# Patient Record
Sex: MALE | Race: WHITE | HISPANIC OR LATINO | Employment: OTHER | URBAN - METROPOLITAN AREA
[De-identification: names, ages, dates, MRNs, and addresses within clinical notes are randomized per-mention and may not be internally consistent; named-entity substitution may affect disease eponyms.]

---

## 2023-09-23 ENCOUNTER — HOSPITAL ENCOUNTER (INPATIENT)
Facility: CLINIC | Age: 58
LOS: 2 days | Discharge: HOME | DRG: 313 | End: 2023-09-25
Attending: EMERGENCY MEDICINE | Admitting: INTERNAL MEDICINE
Payer: COMMERCIAL

## 2023-09-23 ENCOUNTER — TRANSFERRED RECORDS (OUTPATIENT)
Dept: HEALTH INFORMATION MANAGEMENT | Facility: CLINIC | Age: 58
End: 2023-09-23

## 2023-09-23 ENCOUNTER — APPOINTMENT (OUTPATIENT)
Dept: RADIOLOGY | Facility: CLINIC | Age: 58
DRG: 313 | End: 2023-09-23
Attending: EMERGENCY MEDICINE
Payer: COMMERCIAL

## 2023-09-23 DIAGNOSIS — I10 BENIGN ESSENTIAL HYPERTENSION: Primary | ICD-10-CM

## 2023-09-23 DIAGNOSIS — R07.9 CHEST PAIN, UNSPECIFIED TYPE: ICD-10-CM

## 2023-09-23 DIAGNOSIS — R94.31 ACUTE ELECTROCARDIOGRAM CHANGES: ICD-10-CM

## 2023-09-23 LAB
ALBUMIN SERPL BCG-MCNC: 4.1 G/DL (ref 3.5–5.2)
ALP SERPL-CCNC: 89 U/L (ref 40–129)
ALT SERPL W P-5'-P-CCNC: 28 U/L (ref 0–70)
ANION GAP SERPL CALCULATED.3IONS-SCNC: 11 MMOL/L (ref 7–15)
AST SERPL W P-5'-P-CCNC: 24 U/L (ref 0–45)
ATRIAL RATE - MUSE: 76 BPM
BASOPHILS # BLD AUTO: 0.1 10E3/UL (ref 0–0.2)
BASOPHILS NFR BLD AUTO: 1 %
BILIRUB SERPL-MCNC: 0.4 MG/DL
BUN SERPL-MCNC: 11 MG/DL (ref 6–20)
CALCIUM SERPL-MCNC: 8.7 MG/DL (ref 8.6–10)
CHLORIDE SERPL-SCNC: 103 MMOL/L (ref 98–107)
CK SERPL-CCNC: 93 U/L (ref 39–308)
CREAT SERPL-MCNC: 0.87 MG/DL (ref 0.67–1.17)
DEPRECATED HCO3 PLAS-SCNC: 25 MMOL/L (ref 22–29)
DIASTOLIC BLOOD PRESSURE - MUSE: NORMAL MMHG
EGFRCR SERPLBLD CKD-EPI 2021: >90 ML/MIN/1.73M2
EOSINOPHIL # BLD AUTO: 0.2 10E3/UL (ref 0–0.7)
EOSINOPHIL NFR BLD AUTO: 3 %
ERYTHROCYTE [DISTWIDTH] IN BLOOD BY AUTOMATED COUNT: 12.3 % (ref 10–15)
GLUCOSE SERPL-MCNC: 94 MG/DL (ref 70–99)
HCT VFR BLD AUTO: 46.3 % (ref 40–53)
HGB BLD-MCNC: 16 G/DL (ref 13.3–17.7)
IMM GRANULOCYTES # BLD: 0 10E3/UL
IMM GRANULOCYTES NFR BLD: 0 %
INTERPRETATION ECG - MUSE: NORMAL
LYMPHOCYTES # BLD AUTO: 2.3 10E3/UL (ref 0.8–5.3)
LYMPHOCYTES NFR BLD AUTO: 38 %
MCH RBC QN AUTO: 30.2 PG (ref 26.5–33)
MCHC RBC AUTO-ENTMCNC: 34.6 G/DL (ref 31.5–36.5)
MCV RBC AUTO: 87 FL (ref 78–100)
MONOCYTES # BLD AUTO: 0.5 10E3/UL (ref 0–1.3)
MONOCYTES NFR BLD AUTO: 9 %
NEUTROPHILS # BLD AUTO: 3 10E3/UL (ref 1.6–8.3)
NEUTROPHILS NFR BLD AUTO: 49 %
NRBC # BLD AUTO: 0 10E3/UL
NRBC BLD AUTO-RTO: 0 /100
P AXIS - MUSE: 49 DEGREES
PLATELET # BLD AUTO: 223 10E3/UL (ref 150–450)
POTASSIUM SERPL-SCNC: 3.8 MMOL/L (ref 3.4–5.3)
PR INTERVAL - MUSE: 140 MS
PROT SERPL-MCNC: 7.5 G/DL (ref 6.4–8.3)
QRS DURATION - MUSE: 92 MS
QT - MUSE: 382 MS
QTC - MUSE: 429 MS
R AXIS - MUSE: 6 DEGREES
RBC # BLD AUTO: 5.3 10E6/UL (ref 4.4–5.9)
SODIUM SERPL-SCNC: 139 MMOL/L (ref 136–145)
SYSTOLIC BLOOD PRESSURE - MUSE: NORMAL MMHG
T AXIS - MUSE: 26 DEGREES
TROPONIN T SERPL HS-MCNC: 16 NG/L
VENTRICULAR RATE- MUSE: 76 BPM
WBC # BLD AUTO: 6.1 10E3/UL (ref 4–11)

## 2023-09-23 PROCEDURE — 36415 COLL VENOUS BLD VENIPUNCTURE: CPT | Performed by: STUDENT IN AN ORGANIZED HEALTH CARE EDUCATION/TRAINING PROGRAM

## 2023-09-23 PROCEDURE — 96365 THER/PROPH/DIAG IV INF INIT: CPT

## 2023-09-23 PROCEDURE — 250N000011 HC RX IP 250 OP 636: Mod: JZ | Performed by: EMERGENCY MEDICINE

## 2023-09-23 PROCEDURE — 93005 ELECTROCARDIOGRAM TRACING: CPT | Performed by: EMERGENCY MEDICINE

## 2023-09-23 PROCEDURE — 83735 ASSAY OF MAGNESIUM: CPT | Performed by: INTERNAL MEDICINE

## 2023-09-23 PROCEDURE — 200N000001 HC R&B ICU

## 2023-09-23 PROCEDURE — 85025 COMPLETE CBC W/AUTO DIFF WBC: CPT | Performed by: STUDENT IN AN ORGANIZED HEALTH CARE EDUCATION/TRAINING PROGRAM

## 2023-09-23 PROCEDURE — 96376 TX/PRO/DX INJ SAME DRUG ADON: CPT

## 2023-09-23 PROCEDURE — 99285 EMERGENCY DEPT VISIT HI MDM: CPT | Mod: 25

## 2023-09-23 PROCEDURE — 250N000013 HC RX MED GY IP 250 OP 250 PS 637: Performed by: EMERGENCY MEDICINE

## 2023-09-23 PROCEDURE — 80053 COMPREHEN METABOLIC PANEL: CPT | Performed by: STUDENT IN AN ORGANIZED HEALTH CARE EDUCATION/TRAINING PROGRAM

## 2023-09-23 PROCEDURE — 82550 ASSAY OF CK (CPK): CPT | Performed by: EMERGENCY MEDICINE

## 2023-09-23 PROCEDURE — 84484 ASSAY OF TROPONIN QUANT: CPT | Performed by: STUDENT IN AN ORGANIZED HEALTH CARE EDUCATION/TRAINING PROGRAM

## 2023-09-23 PROCEDURE — 71045 X-RAY EXAM CHEST 1 VIEW: CPT

## 2023-09-23 PROCEDURE — 99223 1ST HOSP IP/OBS HIGH 75: CPT | Performed by: INTERNAL MEDICINE

## 2023-09-23 RX ORDER — ASPIRIN 81 MG/1
162 TABLET, CHEWABLE ORAL ONCE
Status: COMPLETED | OUTPATIENT
Start: 2023-09-23 | End: 2023-09-23

## 2023-09-23 RX ORDER — MORPHINE SULFATE 2 MG/ML
2 INJECTION, SOLUTION INTRAMUSCULAR; INTRAVENOUS EVERY 4 HOURS PRN
Status: DISCONTINUED | OUTPATIENT
Start: 2023-09-23 | End: 2023-09-25

## 2023-09-23 RX ORDER — HYDROCODONE BITARTRATE AND ACETAMINOPHEN 5; 325 MG/1; MG/1
1 TABLET ORAL EVERY 4 HOURS PRN
Status: DISCONTINUED | OUTPATIENT
Start: 2023-09-23 | End: 2023-09-25

## 2023-09-23 RX ORDER — ONDANSETRON 2 MG/ML
4 INJECTION INTRAMUSCULAR; INTRAVENOUS EVERY 6 HOURS PRN
Status: DISCONTINUED | OUTPATIENT
Start: 2023-09-23 | End: 2023-09-25 | Stop reason: HOSPADM

## 2023-09-23 RX ORDER — NITROGLYCERIN 0.4 MG/1
0.4 TABLET SUBLINGUAL EVERY 5 MIN PRN
Status: DISCONTINUED | OUTPATIENT
Start: 2023-09-23 | End: 2023-09-25 | Stop reason: HOSPADM

## 2023-09-23 RX ORDER — ASPIRIN 81 MG/1
162 TABLET, CHEWABLE ORAL DAILY
Status: DISCONTINUED | OUTPATIENT
Start: 2023-09-24 | End: 2023-09-24

## 2023-09-23 RX ORDER — ACETAMINOPHEN 325 MG/1
650 TABLET ORAL EVERY 4 HOURS PRN
Status: DISCONTINUED | OUTPATIENT
Start: 2023-09-23 | End: 2023-09-25

## 2023-09-23 RX ORDER — HEPARIN SODIUM 10000 [USP'U]/100ML
0-5000 INJECTION, SOLUTION INTRAVENOUS CONTINUOUS
Status: DISCONTINUED | OUTPATIENT
Start: 2023-09-23 | End: 2023-09-24

## 2023-09-23 RX ORDER — SODIUM CHLORIDE 9 MG/ML
INJECTION, SOLUTION INTRAVENOUS CONTINUOUS
Status: DISCONTINUED | OUTPATIENT
Start: 2023-09-24 | End: 2023-09-24

## 2023-09-23 RX ORDER — ACETAMINOPHEN 650 MG/1
650 SUPPOSITORY RECTAL EVERY 6 HOURS PRN
Status: DISCONTINUED | OUTPATIENT
Start: 2023-09-23 | End: 2023-09-25

## 2023-09-23 RX ORDER — LORATADINE 10 MG/1
10 TABLET ORAL DAILY PRN
COMMUNITY

## 2023-09-23 RX ORDER — ONDANSETRON 4 MG/1
4 TABLET, ORALLY DISINTEGRATING ORAL EVERY 6 HOURS PRN
Status: DISCONTINUED | OUTPATIENT
Start: 2023-09-23 | End: 2023-09-25 | Stop reason: HOSPADM

## 2023-09-23 RX ORDER — IBUPROFEN 200 MG
400 TABLET ORAL EVERY 4 HOURS PRN
COMMUNITY

## 2023-09-23 RX ADMIN — ASPIRIN 81 MG CHEWABLE TABLET 162 MG: 81 TABLET CHEWABLE at 21:39

## 2023-09-23 RX ADMIN — HEPARIN SODIUM 1200 UNITS/HR: 10000 INJECTION, SOLUTION INTRAVENOUS at 21:55

## 2023-09-23 ASSESSMENT — ENCOUNTER SYMPTOMS: PALPITATIONS: 1

## 2023-09-23 ASSESSMENT — ACTIVITIES OF DAILY LIVING (ADL): ADLS_ACUITY_SCORE: 35

## 2023-09-24 ENCOUNTER — APPOINTMENT (OUTPATIENT)
Dept: CARDIOLOGY | Facility: CLINIC | Age: 58
DRG: 313 | End: 2023-09-24
Attending: STUDENT IN AN ORGANIZED HEALTH CARE EDUCATION/TRAINING PROGRAM
Payer: COMMERCIAL

## 2023-09-24 LAB
ALBUMIN SERPL BCG-MCNC: 4 G/DL (ref 3.5–5.2)
ALP SERPL-CCNC: 81 U/L (ref 40–129)
ALT SERPL W P-5'-P-CCNC: 28 U/L (ref 0–70)
ANION GAP SERPL CALCULATED.3IONS-SCNC: 10 MMOL/L (ref 7–15)
AST SERPL W P-5'-P-CCNC: 23 U/L (ref 0–45)
BASOPHILS # BLD AUTO: 0.1 10E3/UL (ref 0–0.2)
BASOPHILS NFR BLD AUTO: 1 %
BILIRUB SERPL-MCNC: 0.5 MG/DL
BUN SERPL-MCNC: 9.9 MG/DL (ref 6–20)
CALCIUM SERPL-MCNC: 8.7 MG/DL (ref 8.6–10)
CHLORIDE SERPL-SCNC: 103 MMOL/L (ref 98–107)
CHOLEST SERPL-MCNC: 163 MG/DL
CHOLEST SERPL-MCNC: 171 MG/DL
CREAT SERPL-MCNC: 0.86 MG/DL (ref 0.67–1.17)
D DIMER PPP FEU-MCNC: <=0.27 UG/ML FEU (ref 0–0.5)
DEPRECATED HCO3 PLAS-SCNC: 26 MMOL/L (ref 22–29)
EGFRCR SERPLBLD CKD-EPI 2021: >90 ML/MIN/1.73M2
EOSINOPHIL # BLD AUTO: 0.1 10E3/UL (ref 0–0.7)
EOSINOPHIL NFR BLD AUTO: 1 %
ERYTHROCYTE [DISTWIDTH] IN BLOOD BY AUTOMATED COUNT: 12.3 % (ref 10–15)
GLUCOSE SERPL-MCNC: 99 MG/DL (ref 70–99)
HBA1C MFR BLD: 5.6 %
HCT VFR BLD AUTO: 45.8 % (ref 40–53)
HDLC SERPL-MCNC: 36 MG/DL
HDLC SERPL-MCNC: 37 MG/DL
HGB BLD-MCNC: 15.9 G/DL (ref 13.3–17.7)
IMM GRANULOCYTES # BLD: 0 10E3/UL
IMM GRANULOCYTES NFR BLD: 0 %
LDLC SERPL CALC-MCNC: 104 MG/DL
LDLC SERPL CALC-MCNC: 109 MG/DL
LVEF ECHO: NORMAL
LYMPHOCYTES # BLD AUTO: 2.6 10E3/UL (ref 0.8–5.3)
LYMPHOCYTES NFR BLD AUTO: 30 %
MAGNESIUM SERPL-MCNC: 2.1 MG/DL (ref 1.7–2.3)
MCH RBC QN AUTO: 30.3 PG (ref 26.5–33)
MCHC RBC AUTO-ENTMCNC: 34.7 G/DL (ref 31.5–36.5)
MCV RBC AUTO: 87 FL (ref 78–100)
MONOCYTES # BLD AUTO: 0.6 10E3/UL (ref 0–1.3)
MONOCYTES NFR BLD AUTO: 7 %
NEUTROPHILS # BLD AUTO: 5.4 10E3/UL (ref 1.6–8.3)
NEUTROPHILS NFR BLD AUTO: 61 %
NONHDLC SERPL-MCNC: 127 MG/DL
NONHDLC SERPL-MCNC: 134 MG/DL
NRBC # BLD AUTO: 0 10E3/UL
NRBC BLD AUTO-RTO: 0 /100
PLATELET # BLD AUTO: 212 10E3/UL (ref 150–450)
POTASSIUM SERPL-SCNC: 4.4 MMOL/L (ref 3.4–5.3)
PROT SERPL-MCNC: 7.1 G/DL (ref 6.4–8.3)
RBC # BLD AUTO: 5.25 10E6/UL (ref 4.4–5.9)
SODIUM SERPL-SCNC: 139 MMOL/L (ref 136–145)
TRIGL SERPL-MCNC: 113 MG/DL
TRIGL SERPL-MCNC: 123 MG/DL
TROPONIN T SERPL HS-MCNC: 17 NG/L
TROPONIN T SERPL HS-MCNC: 18 NG/L
UFH PPP CHRO-ACNC: 0.24 IU/ML
UFH PPP CHRO-ACNC: 0.37 IU/ML
WBC # BLD AUTO: 8.8 10E3/UL (ref 4–11)

## 2023-09-24 PROCEDURE — 85025 COMPLETE CBC W/AUTO DIFF WBC: CPT | Performed by: INTERNAL MEDICINE

## 2023-09-24 PROCEDURE — 85520 HEPARIN ASSAY: CPT | Performed by: INTERNAL MEDICINE

## 2023-09-24 PROCEDURE — 85379 FIBRIN DEGRADATION QUANT: CPT | Performed by: STUDENT IN AN ORGANIZED HEALTH CARE EDUCATION/TRAINING PROGRAM

## 2023-09-24 PROCEDURE — 250N000013 HC RX MED GY IP 250 OP 250 PS 637: Performed by: INTERNAL MEDICINE

## 2023-09-24 PROCEDURE — 99222 1ST HOSP IP/OBS MODERATE 55: CPT | Mod: 25 | Performed by: INTERNAL MEDICINE

## 2023-09-24 PROCEDURE — 84484 ASSAY OF TROPONIN QUANT: CPT | Performed by: INTERNAL MEDICINE

## 2023-09-24 PROCEDURE — 250N000013 HC RX MED GY IP 250 OP 250 PS 637: Performed by: STUDENT IN AN ORGANIZED HEALTH CARE EDUCATION/TRAINING PROGRAM

## 2023-09-24 PROCEDURE — 99232 SBSQ HOSP IP/OBS MODERATE 35: CPT | Performed by: STUDENT IN AN ORGANIZED HEALTH CARE EDUCATION/TRAINING PROGRAM

## 2023-09-24 PROCEDURE — 258N000003 HC RX IP 258 OP 636: Performed by: INTERNAL MEDICINE

## 2023-09-24 PROCEDURE — 36415 COLL VENOUS BLD VENIPUNCTURE: CPT | Performed by: INTERNAL MEDICINE

## 2023-09-24 PROCEDURE — 84484 ASSAY OF TROPONIN QUANT: CPT | Performed by: STUDENT IN AN ORGANIZED HEALTH CARE EDUCATION/TRAINING PROGRAM

## 2023-09-24 PROCEDURE — 83036 HEMOGLOBIN GLYCOSYLATED A1C: CPT | Performed by: STUDENT IN AN ORGANIZED HEALTH CARE EDUCATION/TRAINING PROGRAM

## 2023-09-24 PROCEDURE — 120N000013 HC R&B IMCU

## 2023-09-24 PROCEDURE — 80053 COMPREHEN METABOLIC PANEL: CPT | Performed by: INTERNAL MEDICINE

## 2023-09-24 PROCEDURE — 93306 TTE W/DOPPLER COMPLETE: CPT

## 2023-09-24 PROCEDURE — 85520 HEPARIN ASSAY: CPT | Performed by: EMERGENCY MEDICINE

## 2023-09-24 PROCEDURE — 80061 LIPID PANEL: CPT | Performed by: INTERNAL MEDICINE

## 2023-09-24 PROCEDURE — 36415 COLL VENOUS BLD VENIPUNCTURE: CPT | Performed by: STUDENT IN AN ORGANIZED HEALTH CARE EDUCATION/TRAINING PROGRAM

## 2023-09-24 PROCEDURE — 93306 TTE W/DOPPLER COMPLETE: CPT | Mod: 26 | Performed by: INTERNAL MEDICINE

## 2023-09-24 RX ORDER — ENOXAPARIN SODIUM 100 MG/ML
40 INJECTION SUBCUTANEOUS EVERY 24 HOURS
Status: DISCONTINUED | OUTPATIENT
Start: 2023-09-24 | End: 2023-09-25 | Stop reason: HOSPADM

## 2023-09-24 RX ORDER — LANOLIN ALCOHOL/MO/W.PET/CERES
3 CREAM (GRAM) TOPICAL
Status: DISCONTINUED | OUTPATIENT
Start: 2023-09-24 | End: 2023-09-25 | Stop reason: HOSPADM

## 2023-09-24 RX ORDER — LOSARTAN POTASSIUM 50 MG/1
50 TABLET ORAL DAILY
Status: DISCONTINUED | OUTPATIENT
Start: 2023-09-24 | End: 2023-09-25 | Stop reason: HOSPADM

## 2023-09-24 RX ORDER — ASPIRIN 81 MG/1
81 TABLET ORAL DAILY
Status: DISCONTINUED | OUTPATIENT
Start: 2023-09-25 | End: 2023-09-25

## 2023-09-24 RX ADMIN — LOSARTAN POTASSIUM 50 MG: 50 TABLET, FILM COATED ORAL at 11:54

## 2023-09-24 RX ADMIN — SODIUM CHLORIDE: 9 INJECTION, SOLUTION INTRAVENOUS at 00:57

## 2023-09-24 RX ADMIN — ACETAMINOPHEN 650 MG: 325 TABLET ORAL at 20:26

## 2023-09-24 RX ADMIN — ACETAMINOPHEN 650 MG: 325 TABLET ORAL at 11:54

## 2023-09-24 RX ADMIN — METOPROLOL TARTRATE 12.5 MG: 25 TABLET, FILM COATED ORAL at 08:09

## 2023-09-24 RX ADMIN — ASPIRIN 81 MG CHEWABLE TABLET 162 MG: 81 TABLET CHEWABLE at 08:09

## 2023-09-24 ASSESSMENT — ACTIVITIES OF DAILY LIVING (ADL)
ADLS_ACUITY_SCORE: 20
WEAR_GLASSES_OR_BLIND: YES
DRESSING/BATHING_DIFFICULTY: NO
ADLS_ACUITY_SCORE: 20
CHANGE_IN_FUNCTIONAL_STATUS_SINCE_ONSET_OF_CURRENT_ILLNESS/INJURY: NO
DOING_ERRANDS_INDEPENDENTLY_DIFFICULTY: NO
VISION_MANAGEMENT: GLASSES
FALL_HISTORY_WITHIN_LAST_SIX_MONTHS: NO
ADLS_ACUITY_SCORE: 20
DIFFICULTY_EATING/SWALLOWING: NO
ADLS_ACUITY_SCORE: 20
ADLS_ACUITY_SCORE: 20
WALKING_OR_CLIMBING_STAIRS_DIFFICULTY: NO
ADLS_ACUITY_SCORE: 20
ADLS_ACUITY_SCORE: 35
ADLS_ACUITY_SCORE: 20
ADLS_ACUITY_SCORE: 20
TOILETING_ISSUES: NO
CONCENTRATING,_REMEMBERING_OR_MAKING_DECISIONS_DIFFICULTY: NO
ADLS_ACUITY_SCORE: 20

## 2023-09-24 NOTE — PROGRESS NOTES
Patient is alert and oriented. Pt endorses discomfort in his left chest area. Relieved with reposition and tylenol. Echo done at bedside. Heparin and IV fluids stopped. Pt is NSR on tele with PACs. NPO at midnight for stress test in the AM.     Rachel Frost RN

## 2023-09-24 NOTE — ED NOTES
Expected Patient Referral to ED  9:00 PM    Referring Clinic/Provider:  Dr [facility]    Reason for referral/Clinical facts:  58M,   Left sided chest pain intermittently  EKG looked good  Trop 0.059 (0.034 is upper limit of normal)  VSS    Recommendations provided:  Send to ED for further evaluation    Caller was informed that this institution does possess the capabilities and/or resources to provide for patient and should be transferred to our facility.    Discussed that if direct admit is sought and any hurdles are encountered, this ED would be happy to see the patient and evaluate.    Informed caller that recommendations provided are recommendations based only on the facts provided and that they responsible to accept or reject the advice, or to seek a formal in person consultation as needed and that this ED will see/treat patient should they arrive.      Garland Gonzalez MD  [facility] EMERGENCY ROOM  [address] [address]  [phone]     Garland Gonzalez MD  09/23/23 3749

## 2023-09-24 NOTE — ED TRIAGE NOTES
Patient arrives to the ER after being evaluated at the [facility] for chest discomfort that started a couple days ago. Patient states that he does have some mild shortness of breath, Hypertensive in triage. Trop at UR was 0.059     Triage Assessment       Row Name 09/23/23 2129       Triage Assessment (Adult)    Airway WDL WDL       Respiratory WDL    Respiratory WDL X;rhythm/pattern    Rhythm/Pattern, Respiratory dyspnea on exertion       Skin Circulation/Temperature WDL    Skin Circulation/Temperature WDL WDL       Cardiac WDL    Cardiac WDL X;chest pain       Chest Pain Assessment    Chest Pain Location anterior chest, left    Chest Pain Intervention 12-lead ECG obtained       Peripheral/Neurovascular WDL    Peripheral Neurovascular WDL WDL       Cognitive/Neuro/Behavioral WDL    Cognitive/Neuro/Behavioral WDL WDL

## 2023-09-24 NOTE — PROGRESS NOTES
"[facility]    Medicine Progress Note - Hospitalist Service    Date of Admission:  9/23/2023    Assessment & Plan   Mr. Hooper is a 58 years old man with no known significant past medical history who presented to the hospital with 2 to 3 weeks of intermittent chest pain most likely musculoskeletal in nature.      Chest pain  -2 to 3 weeks of chest pain on the left side.  -Tenderness to palpation  -Increase activity over the last few weeks including playing tennis and weightlifting  -Pain is most consistent with musculoskeletal etiology  -Per documentation, patient had an elevated troponin at the urgent care clinic.  Nevertheless, troponins x3 negative.   -Per documentation, EKG in the ED showed ST segment depression in inferior lateral lead.  I was unable to see this EKG.  EKG in the system without any clear ST segment changes.  -Transthoracic echocardiogram without major abnormalities.  -Patient was started on heparin drip on presentation.    Plan  -Discontinue heparin drip for now.  -N.p.o. after midnight for stress test on 9/25  -Started on metoprolol tartrate this morning, will hold for stress test on 9/25  -Check hemoglobin A1c  -Check D-dimer  -Plan to discharge on 9/25 if stress test is negative.    Hypertension  -Does not check blood pressure at home  -Blood pressure elevated on presentation    Plan  -Appreciate cardiology recommendation, patient was started on losartan 50 mg daily.  -Continue to monitor blood pressure.       Diet: Regular Diet Adult    DVT Prophylaxis: Enoxaparin (Lovenox) SQ  Wolf Catheter: Not present  Lines: None     Cardiac Monitoring: ACTIVE order. Indication: Chest pain/ ACS rule out (24 hours)  Code Status: Full Code      Clinically Significant Risk Factors Present on Admission                       # Obesity: Estimated body mass index is 36.78 kg/m  as calculated from the following:    Height as of this encounter: 1.727 m (5' 8\").    Weight as of this " encounter: 109.7 kg (241 lb 14.4 oz).              Disposition Plan     Expected Discharge Date: 09/25/2023      Destination: home            JOHN MCCAIN MD  Hospitalist Service  [facility]  Securely message with [employer] (more info)  Text page via "[employer] Paging/Directory   ______________________________________________________________________    Interval History   No significant events.  This morning, patient did not have any significant chest pain or shortness of breath.  He denies any heart palpitation.  He denies any lightheadedness or diaphoresis.      Physical Exam   Vital Signs: Temp: 99.1  F (37.3  C) Temp src: Oral BP: (!) 146/91 Pulse: 72   Resp: 20 SpO2: 95 % O2 Device: None (Room air)    Weight: 241 lbs 14.4 oz    Physical Exam  Constitutional:       General: He is not in acute distress.     Appearance: He is not ill-appearing or toxic-appearing.   Cardiovascular:      Rate and Rhythm: Normal rate.      Heart sounds: No murmur heard.  Pulmonary:      Effort: Pulmonary effort is normal. No respiratory distress.      Breath sounds: No wheezing.   Musculoskeletal:      Comments: Point tenderness to palpation of the pectoralis muscle on the left side.   Skin:     General: Skin is warm and dry.   Neurological:      Mental Status: He is alert.   Psychiatric:         Mood and Affect: Mood normal.         Thought Content: Thought content normal.          Medical Decision Making       45 MINUTES SPENT BY ME on the date of service doing chart review, history, exam, documentation & further activities per the note.      Data     I have personally reviewed the following data over the past 24 hrs:    8.8  \   15.9   / 212     139 103 9.9 /  99   4.4 26 0.86 \     ALT: 28 AST: 23 AP: 81 TBILI: 0.5   ALB: 4.0 TOT PROTEIN: 7.1 LIPASE: N/A     Trop: 17 BNP: N/A       Imaging results reviewed over the past 24 hrs:   Recent Results (from the past 24 hour(s))   XR CHEST 2 VIEWS PA AND LATERAL     Narrative    For Patients: As a result of the  Cures Act, medical imaging exams and procedure reports are released immediately into your electronic medical record. You may view this report before your referring provider. If you have questions, please contact your health care provider.    EXAM: XR CHEST 2 VIEWS PA AND LATERAL  LOCATION: [facility]  DATE: 2023    INDICATION: Chest pain  COMPARISON: 1930    Impression    Heart size is normal. Lungs are clear bilaterally. Mediastinum and visualized bony structures are unremarkable.   XR Chest Port 1 View    Narrative    EXAM: XR CHEST PORT 1 VIEW  LOCATION: [facility]  DATE: 2023    INDICATION: Chest pain  COMPARISON: 2023      Impression    IMPRESSION: Negative chest.   Echocardiogram Complete   Result Value    LVEF  60-65%    Narrative    [id number]  [id number]  [id number]  [id number]^KALYN^[place] [address]     Name: EMILY FELTON  MRN: [id number]  : 1965  Study Date: 2023 10:04 AM  Age: 58 yrs  Gender: Male  Patient Location: [facility]  Reason For Study: Chest Pain  Ordering Physician: JOHN MCCAIN  Referring Physician: JOHN MCCAIN  Performed By: MAYELIN     BSA: 2.2 m2  Height: 68 in  Weight: 241 lb  HR: 67  ______________________________________________________________________________  Procedure  Complete Echo Adult. No hemodynamically significant valvular abnormalities on  2D or color flow imaging.  ______________________________________________________________________________  Interpretation Summary     The left ventricle is normal in size.  Left ventricular function is normal.The ejection fraction is 60-65%.  Normal right ventricle size and systolic function.  The left atrium is mildly dilated.  No hemodynamically significant valvular abnormalities on 2D or color  flow  imaging.  ______________________________________________________________________________  Left Ventricle  The left ventricle is normal in size. Left ventricular function is normal.The  ejection fraction is 60-65%. There is normal left ventricular wall thickness.  Left ventricular diastolic function is normal. No regional wall motion  abnormalities noted.     Right Ventricle  Normal right ventricle size and systolic function.     Atria  The left atrium is mildly dilated. Right atrial size is normal. There is no  color Doppler evidence of an atrial shunt.     Mitral Valve  Mitral valve leaflets appear normal. There is no evidence of mitral stenosis  or clinically significant mitral regurgitation.     Tricuspid Valve  The tricuspid valve is not well visualized, but is grossly normal. This degree  of valvular regurgitation is within normal limits.     Aortic Valve  Aortic valve leaflets appear normal. There is no evidence of aortic stenosis  or clinically significant aortic regurgitation.     Pulmonic Valve  The pulmonic valve is not well visualized.     Vessels  The aorta root is normal. Normal size ascending aorta.     Pericardium  There is no pericardial effusion.     ______________________________________________________________________________  MMode/2D Measurements & Calculations  IVSd: 0.95 cm  LVIDd: 5.5 cm  LVIDs: 3.3 cm  LVPWd: 0.96 cm  FS: 39.4 %     LV mass(C)d: 203.1 grams  LV mass(C)dI: 91.8 grams/m2  Ao root diam: 3.1 cm  LA dimension: 4.0 cm  asc Aorta Diam: 3.3 cm  LA/Ao: 1.3  LVOT diam: 2.4 cm  LVOT area: 4.4 cm2  Ao root diam index Ht(cm/m): 1.8  Ao root diam index BSA (cm/m2): 1.4  Asc Ao diam index BSA (cm/m2): 1.5  Asc Ao diam index Ht(cm/m): 1.9  LA Volume (BP): 54.3 ml  LA Volume Indexed (AL/bp): 25.9 ml/m2     RV Base: 3.8 cm  RWT: 0.35  TAPSE: 2.9 cm     Time Measurements  MM HR: 60.0 BPM     Doppler Measurements & Calculations  MV E max vikas: 51.0 cm/sec  MV A max vikas: 66.4 cm/sec  MV  E/A: 0.77  MV dec slope: 206.9 cm/sec2  MV dec time: 0.25 sec  Ao V2 max: 129.0 cm/sec  Ao max P.0 mmHg  Ao V2 mean: 97.7 cm/sec  Ao mean P.1 mmHg  Ao V2 VTI: 27.5 cm  KENDELL(I,D): 2.5 cm2  KENDELL(V,D): 2.5 cm2  LV V1 max P.2 mmHg  LV V1 max: 73.7 cm/sec  LV V1 VTI: 15.8 cm  SV(LVOT): 69.1 ml  SI(LVOT): 31.3 ml/m2  PA acc time: 0.10 sec  AV Finn Ratio (DI): 0.57  KENDELL Index (cm2/m2): 1.1     E/E' av.5  Lateral E/e': 4.6  Medial E/e': 6.4  RV S Finn: 16.6 cm/sec     ______________________________________________________________________________  Report approved by: Chanel Gutiérrez 2023 02:41 PM

## 2023-09-24 NOTE — CONSULTS
"  HEART CARE ENCOUNTER CONSULTATON NOTE      [facility]  [phone]      Assessment/Recommendations   Assessment:  Chest pain: Chest pain is somewhat atypical in nature.  Although initial troponin at outside facility was mildly abnormal 2 troponins here was negative.  Pain does seem like it may be musculoskeletal.  As we are talking he noted another twinge of chest pain more in the mid chest area.  Discussed further evaluation with stress testing either in or outpatient.  With recurrence of pain he would like to stay until tomorrow for stress testing here.  We will obtain an echocardiogram today.  Hypertension: Poorly controlled  Plan:  Echocardiogram pending  May stop heparin gtt   Aspirin and check fasting lipids   NPO tonight for stress testing tomorrow  Start losartan 50 mg a day for better blood pressure control       History of Present Illness/Subjective    This is a 58 year old man who was admitted for chest pain.  He was initially seen at [facility] urgent care and due to a mildly elevated troponin was transferred to [facility] for further management. He just start exercising more playing tennis and lifting weights.  He started noticing discomfort in his axillae.  Discomfort like an ache.  No tenderness or radiation of pain.  Pain worse with certain movements, positions.  Pain was worse with lifting weights.  Pain nonpleuritic.  Troponin times two negative here.         Physical Examination  Review of Systems   Vitals: BP (!) 172/105 (BP Location: Left arm)   Pulse 72   Temp 98.3  F (36.8  C) (Oral)   Resp 18   Ht 1.727 m (5' 8\")   Wt 109.7 kg (241 lb 14.4 oz)   SpO2 97%   BMI 36.78 kg/m    BMI= Body mass index is 36.78 kg/m .  Wt Readings from Last 3 Encounters:   09/24/23 109.7 kg (241 lb 14.4 oz)       General Appearance:   no distress, normal body habitus   ENT/Mouth: membranes moist, no oral lesions or bleeding gums.      EYES:  no scleral icterus, normal conjunctivae    "    Chest/Lungs:   lungs are clear to auscultation   Cardiovascular:   Regular. Normal first and second heart sounds with no murmur no edema bilaterally        Extremities: no cyanosis or clubbing   Skin: no xanthelasma, warm.    Neurologic: normal  bilateral, no tremors     Psychiatric: alert and oriented x3, calm        Please refer above for cardiac ROS details.        Medical History  Surgical History Family History Social History   hypertension History reviewed. No pertinent surgical history.  Father had history of heart disease in his 70s   Social History     Socioeconomic History    Marital status:      Spouse name: Not on file    Number of children: Not on file    Years of education: Not on file    Highest education level: Not on file   Occupational History    Not on file   Tobacco Use    Smoking status: Not on file    Smokeless tobacco: Not on file   Substance and Sexual Activity    Alcohol use: Not on file    Drug use: Not on file    Sexual activity: Not on file   Other Topics Concern    Not on file   Social History Narrative    Not on file     Social Determinants of Health     Financial Resource Strain: Not on file   Food Insecurity: Not on file   Transportation Needs: Not on file   Physical Activity: Not on file   Stress: Not on file   Social Connections: Not on file   Interpersonal Safety: Not on file   Housing Stability: Not on file           Medications  Allergies   No current outpatient medications on file.     No Known Allergies       Lab Results    Chemistry/lipid CBC Cardiac Enzymes/BNP/TSH/INR   Recent Labs   Lab Test 09/24/23  0421   CHOL 171   HDL 37*   *   TRIG 123     Recent Labs   Lab Test 09/24/23  0421   *     Recent Labs   Lab Test 09/24/23  0421      POTASSIUM 4.4   CHLORIDE 103   CO2 26   GLC 99   BUN 9.9   CR 0.86   GFRESTIMATED >90   HANNA 8.7     Recent Labs   Lab Test 09/24/23  0421 09/23/23  2133   CR 0.86 0.87     No results for input(s): A1C in the  last 86930 hours.       Recent Labs   Lab Test 09/24/23  0421   WBC 8.8   HGB 15.9   HCT 45.8   MCV 87        Recent Labs   Lab Test 09/24/23  0421 09/23/23  2133   HGB 15.9 16.0    No results for input(s): TROPONINI in the last 88759 hours.  No results for input(s): BNP, NTBNPI, NTBNP in the last 05477 hours.  No results for input(s): TSH in the last 73906 hours.  No results for input(s): INR in the last 40781 hours.     Piedad Tinoco MD

## 2023-09-24 NOTE — H&P
[facility] MEDICINE ADMISSION HISTORY AND PHYSICAL       Assessment & Plan      1. Left sided chest pain -- Initial trop at  is (+), and repeat here is negative, EKG suspicious for ischemic changes, early rep, SR    R/O coronary vasospasm  Low suspicion for PE or pericarditis or dissection  Possible risk factors - fam hx with father had CABG, DMI of 35, and possible CRIS    - serial trops, nitroglycerin, may continue heparin and ASA  - cardiology eval  - tele and pulse ox  - lipid check     2. CRIS suspect  - advised sleep study as outpatient - agreed          VTE prophylaxis --  on heparin   Diet -- NPO  Code Status -- Full,  Barriers to discharge -- Admitting medical condition/s  Discharge Disposition and goals --  Unable to determine at this point, pending clinical progress and response to treatment. Patient may need transfer to SNF or [facility] if unsafe to go home and needed treatment inappropriate at home setting OR may need home health care evaluation if care can be delivered at home settings. Consider referral to care manager/    PPE - I was wearing PPE when I met the patient including but not limited to - N95 mask, Gloves, and/or Safety glasses.  Admission Status -- Inpatient     Care plan was created based on available information and patient's condition at the time of encounter. This was discussed with the patient and/or family members using layman's terms, including counseling/education and they have agreed to proceed. I recommend to revise care plan and to review history if there is change in condition and/or new clinical information that is not available during my encounter. At the end of night shift, this case will be presented to the [facility] Emergency Department Hospitalist.       75 minutes spent by me on the date of service doing chart review, history, exam, diagnostic test results interpretation, documentation & further activities per the note.      Yunier Brownlee MD, MPH, FACP,  [facility]  Internal Medicine - Hospitalist        Chief Complaint Chest pain       HISTORY     - Patient is in ED - 9. He is here with chest pain from urgent care, had trop elevation. He was sent to us. Chest XR - negative.    - He presented with 1 week history of intermittent left-sided chest pain. Non radiating. No cough or colds  - Recent lifting weights, trying to lose weights.    - ED course - Trop neg. EKG showed non specific STT changes, ?mild ischemia. He was given heparin infusion, given concerns for EKG, and also ASA     - ROS --- No headache. No dizziness. No weakness. No palpitations. No abdominal pain. No nausea or vomiting. No urinary symptoms. No bleeding symptoms. No weight loss. Rest of 12 point ROS was reviewed and negative.       Past Medical History     History reviewed. No pertinent past medical history.      Surgical History     History reviewed. No pertinent surgical history.     Family History      History reviewed. No pertinent family history.      Social History      .  Social History     Socioeconomic History    Marital status:      Spouse name: Not on file    Number of children: Not on file    Years of education: Not on file    Highest education level: Not on file   Occupational History    Not on file   Tobacco Use    Smoking status: Not on file    Smokeless tobacco: Not on file   Substance and Sexual Activity    Alcohol use: Not on file    Drug use: Not on file    Sexual activity: Not on file   Other Topics Concern    Not on file   Social History Narrative    Not on file     Social Determinants of Health     Financial Resource Strain: Not on file   Food Insecurity: Not on file   Transportation Needs: Not on file   Physical Activity: Not on file   Stress: Not on file   Social Connections: Not on file   Interpersonal Safety: Not on file   Housing Stability: Not on file          Allergies      No Known Allergies      Prior to Admission Medications      No current facility-administered  "medications on file prior to encounter.  ibuprofen (ADVIL/MOTRIN) 200 MG tablet, Take 400 mg by mouth every 4 hours as needed for pain  loratadine (CLARITIN) 10 MG tablet, Take 10 mg by mouth daily as needed for allergies            Review of Systems     A 12 point comprehensive review of systems was negative except as noted above in HPI.    PHYSICAL EXAMINATION       Vitals      Vitals: BP (!) 186/102   Pulse 76   Temp 98.2  F (36.8  C) (Oral)   Resp 22   Ht 1.727 m (5' 8\")   Wt 106.6 kg (235 lb)   SpO2 98%   BMI 35.73 kg/m    BMI= Body mass index is 35.73 kg/m .      Examination     General Appearance:  Alert, cooperative, no distress  Head:    Normocephalic, without obvious abnormality, atraumatic  EENT:  PERRL, conjunctiva/corneas clear, EOM's intact.   Neck:   Supple, symmetrical, trachea midline, no adenopathy; no NVE  Back:  Symmetric, no curvature, no CVA tenderness  Chest/Lungs: Clear to auscultation bilaterally, respirations unlabored, No tenderness or deformity. No abdominal breathing or use of accessory muscles.   Heart:    Regular rate and rhythm, S1 and S2 normal, no murmur, rub   or gallop  Abdomen: Soft, non-tender, bowel sounds active all four quadrants, not peritoneal on palpation. Not distended  Extremities:  Extremities normal, atraumatic, no swelling   Skin:  Skin color, texture, turgor normal, no rashes or lesion  Neurologic:  Awake and alert, No lateralizing or localizing signs       Pertinent Lab     Results for orders placed or performed during the hospital encounter of 09/23/23   XR Chest Port 1 View    Impression    IMPRESSION: Negative chest.   Comprehensive metabolic panel   Result Value Ref Range    Sodium 139 136 - 145 mmol/L    Potassium 3.8 3.4 - 5.3 mmol/L    Chloride 103 98 - 107 mmol/L    Carbon Dioxide (CO2) 25 22 - 29 mmol/L    Anion Gap 11 7 - 15 mmol/L    Urea Nitrogen 11.0 6.0 - 20.0 mg/dL    Creatinine 0.87 0.67 - 1.17 mg/dL    Calcium 8.7 8.6 - 10.0 mg/dL    Glucose " 94 70 - 99 mg/dL    Alkaline Phosphatase 89 40 - 129 U/L    AST 24 0 - 45 U/L    ALT 28 0 - 70 U/L    Protein Total 7.5 6.4 - 8.3 g/dL    Albumin 4.1 3.5 - 5.2 g/dL    Bilirubin Total 0.4 <=1.2 mg/dL    GFR Estimate >90 >60 mL/min/1.73m2   Troponin T, High Sensitivity (now)   Result Value Ref Range    Troponin T, High Sensitivity 16 <=22 ng/L   CBC with platelets and differential   Result Value Ref Range    WBC Count 6.1 4.0 - 11.0 10e3/uL    RBC Count 5.30 4.40 - 5.90 10e6/uL    Hemoglobin 16.0 13.3 - 17.7 g/dL    Hematocrit 46.3 40.0 - 53.0 %    MCV 87 78 - 100 fL    MCH 30.2 26.5 - 33.0 pg    MCHC 34.6 31.5 - 36.5 g/dL    RDW 12.3 10.0 - 15.0 %    Platelet Count 223 150 - 450 10e3/uL    % Neutrophils 49 %    % Lymphocytes 38 %    % Monocytes 9 %    % Eosinophils 3 %    % Basophils 1 %    % Immature Granulocytes 0 %    NRBCs per 100 WBC 0 <1 /100    Absolute Neutrophils 3.0 1.6 - 8.3 10e3/uL    Absolute Lymphocytes 2.3 0.8 - 5.3 10e3/uL    Absolute Monocytes 0.5 0.0 - 1.3 10e3/uL    Absolute Eosinophils 0.2 0.0 - 0.7 10e3/uL    Absolute Basophils 0.1 0.0 - 0.2 10e3/uL    Absolute Immature Granulocytes 0.0 <=0.4 10e3/uL    Absolute NRBCs 0.0 10e3/uL           Pertinent Radiology

## 2023-09-24 NOTE — PROGRESS NOTES
Care Management Follow Up    Length of Stay (days): 1    Expected Discharge Date: 09/25/2023     Concerns to be Addressed: discharge planning,  stress testing tomorrow     Patient plan of care discussed at interdisciplinary rounds: Yes    Anticipated Discharge Disposition: Home     Anticipated Discharge Services: None    Anticipated Discharge DME: None    Education Provided on the Discharge Plan: Yes (AVS will be per bedside RN)    Patient/Family in Agreement with the Plan: yes    Referrals Placed by CM/SW:  n/a        Additional Information:  CM reviewed chart. No CM needs identified. Echocardiogram pending. NPO tonight for stress testing tomorrow. Anticipate discharge home with family. Family to provide transportation home at discharge.     Pretty Wright RN

## 2023-09-24 NOTE — ED PROVIDER NOTES
EMERGENCY DEPARTMENT ENCOUNTER      NAME: Mark Hooper  AGE: 58 year old male  YOB: 1965  MRN: [id number]  EVALUATION DATE & TIME: 9/23/2023  9:33 PM    PCP: No Ref-Primary, Physician    ED PROVIDER: Lorena Maier M.D.      Chief Complaint   Patient presents with    Chest Pain         FINAL IMPRESSION:  1. Chest pain, unspecified type    2. Acute electrocardiogram changes        MEDICAL DECISION MAKING:    Pertinent Labs & Imaging studies reviewed. (See chart for details)  ED Course as of 09/23/23 2245   Sat Sep 23, 2023   2147 Afebrile vital signs here with hypertension.  Patient is coming in for evaluation of chest discomfort has been ongoing for the past week and elevated troponin seen from urgent care.  When in urgent care, states that he has been working out more, has been having some discomfort along the left side of his chest for the last few days.  Also was feeling some heart palpitations during that time.  Says he initially thought it was secondary to musculoskeletal issue.  He did have some pain and discomfort this morning he took an ibuprofen and he did have some relief.  Because discomfort has been ongoing he decided to go to the urgency room just to get checked out.  There he was found to be hypertensive with blood pressures at 183/118.  Had initial EKG performed that did not show any signs of ischemia.  Had blood work performed that showed elevated troponin at a value of 0.059 with her cutoff being 0.034 or below.  As such she was sent to the emergency department for further evaluation.  He did not receive aspirin at urgent care.  I did review the note from the [facility] urgency room from/23/23.  Also reviewed EKG from that visit done at 1959 that shows heart rate 71, sinus.  Normal axis.  No ST elevations or depressions.  No ectopy.  Intervals are intact.  QTc was 413, QRS 96, .  EKG done here on arrival at 2129 shows sinus rhythm at a rate of 76.  He now has slight ST  depression seen in his inferior leads II, III, aVF with some T wave inversion seen in lead III which is new from previous EKG at urgency room.  Otherwise she also has very slight depressions noted in extreme lateral leads V5 and V6.  These seem slightly worsened from previous EKG from 1959.   2147 Patient immediately given aspirin here in the emergency department.  We will recheck labs and troponin.  Started on heparin.  Serial EKGs as needed for any development of chest discomfort.   2147 Of note patient states that he does not have any significant medical history, does not make take medications for anything on a daily basis.     Patient's high-sensitivity troponin here without significant elevation.  Certainly does not put him in the range of NSTEMI.  Was already started on heparin.  However he does have EKG changes as noted above.  Given the fact that he did have an elevated troponin seen at outside facility, has EKGs changes evident here on his EKG despite troponin here that does not seem to be alarming we will continue with plan of care with heparin, admission and cardiology evaluation.  Regardless he will need evaluation for blood pressure, remains elevated here with blood pressures in the 180s.  Was 180s at urgent care.  No previous comparisons for blood pressures for patient in the past.  No diagnosed hypertension in the past either.  Patient was seen in the emergency room on 3/14/2018 at that time was noted to be hypertensive with blood pressure reading at 159/91.    Medical Decision Making    History:  Supplemental history from: Documented in chart, if applicable  External Record(s) reviewed: Documented in chart, if applicable. and Outpatient Record: [facility] Room Visit in [place] on 9/23/23     Work Up:  Chart documentation includes differential considered and any EKGs or imaging independently interpreted by provider, where specified.  In additional to work up documented, I considered the following work  up: Documented in chart, if applicable.    External consultation:  Discussion of management with another provider: Documented in chart, if applicable    Complicating factors:  Care impacted by chronic illness: N/A  Care affected by social determinants of health: N/A    Disposition considerations: Admit.      Critical care: 0 minutes excluding separately billable procedures.  Includes bedside management, time reviewing test results, review of records, discussing the case with staff, documenting the medical record and time spent with family members (or surrogate decision makers) discussing specific treatment issues.          ED COURSE:  9:38 PM I met with the patient, obtained history, performed an initial exam, and discussed options and plan for diagnostics and treatment here in the ED.   10:19 PM I rechecked and updated patient on results. Discussed plan for admission.  10:42 PM I discussed case with Dr. Brownlee, hospitalist, who accepts the patient for admission.     The importance of close follow up was discussed. We reviewed warning signs and symptoms, and I instructed Mr. Hooper to return to the emergency department immediately if he develops any new or worsening symptoms. I provided additional verbal discharge instructions. Mr. Hooper expressed understanding and agreement with this plan of care, his questions were answered, and he was discharged in stable condition.     MEDICATIONS GIVEN IN THE EMERGENCY:  Medications   heparin 25,000 units in 0.45% NaCl 250 mL ANTICOAGULANT infusion (1,200 Units/hr Intravenous $New Bag 9/23/23 2155)   aspirin (ASA) chewable tablet 162 mg (162 mg Oral $Given 9/23/23 2139)   heparin ANTICOAGULANT loading dose for  LOW INTENSITY TREATMENT* Give BEFORE starting heparin infusion (6,000 Units Intravenous $Given 9/23/23 2154)       NEW PRESCRIPTIONS STARTED AT TODAY'S ER VISIT:  New Prescriptions    No medications on file           =================================================================    HPI    Patient information was obtained from: Patient    Use of : N/A         Mark Hooper is a 58 year old male with no pertinent history who presents to the ED via walk-in for the evaluation of chest pain.     Patient reports left chest discomfort that started several days ago. He notes associating palpitations. Patient mentions that he has been working out more and initially thought his symptoms were secondary to a musculoskeletal issue. Patient reports he did have some pain this morning and did take an Ibuprofen with some relief. However, since chest discomfort has persisted, he decided to be seen in the Urgency Room. There, he was found to have a blood pressure of 183/118. Blood work showed elevated troponin. Patient was sent to the ED for further evaluation. He did not receive aspirin there. Otherwise, patient denies any significant medical history. He does not take any medications regularly. No other complaints at this time.    Per chart review, patient was seen at the [facility] Room in [place] on 9/23/23 for chest pain. Chest XR done. Results showed heart size is normal. Lungs are clear bilaterally. Mediastinum and visualized bony structures are unremarkable. EKG without any signs of ischemia. Blood work done. Troponin 0.059. Potassium 3.4. MCH 31.5. Otherwise all other values within normal limits. Patient sent to ED for further evaluation.    REVIEW OF SYSTEMS   Review of Systems   Cardiovascular:  Positive for chest pain and palpitations.   All other systems reviewed and are negative.    PAST MEDICAL HISTORY:  History reviewed. No pertinent past medical history.    PAST SURGICAL HISTORY:  History reviewed. No pertinent surgical history.    CURRENT MEDICATIONS:      Current Facility-Administered Medications:     heparin 25,000 units in 0.45% NaCl 250 mL ANTICOAGULANT infusion, 0-5,000 Units/hr, Intravenous, Continuous,  "Lorena Maier MD, Last Rate: 12 mL/hr at 09/23/23 2155, 1,200 Units/hr at 09/23/23 2155    Current Outpatient Medications:     ibuprofen (ADVIL/MOTRIN) 200 MG tablet, Take 400 mg by mouth every 4 hours as needed for pain, Disp: , Rfl:     loratadine (CLARITIN) 10 MG tablet, Take 10 mg by mouth daily as needed for allergies, Disp: , Rfl:     ALLERGIES:  No Known Allergies    FAMILY HISTORY:  History reviewed. No pertinent family history.    SOCIAL HISTORY:   Social History     Socioeconomic History    Marital status:        PHYSICAL EXAM:    Vitals: BP (!) 186/102   Pulse 76   Temp 98.2  F (36.8  C) (Oral)   Resp 22   Ht 1.727 m (5' 8\")   Wt 106.6 kg (235 lb)   SpO2 98%   BMI 35.73 kg/m     General:. Alert and interactive, comfortable appearing.  HENT: Oropharynx without erythema or exudates. MMM.  TMs clear bilaterally.  Eyes: Pupils mid-sized and equally reactive.   Neck: Full AROM.  No midline tenderness to palpation.  Cardiovascular: Regular rate and rhythm. Peripheral pulses 2+ bilaterally.  Chest/Pulmonary: Normal work of breathing. Lung sounds clear and equal throughout, no wheezes or crackles. No chest wall tenderness or deformities.  Abdomen: Soft, nondistended. Nontender without guarding or rebound.  Back/Spine: No CVA or midline tenderness.  Extremities: Normal ROM of all major joints. No lower extremity edema.   Skin: Warm and dry. Normal skin color.   Neuro: Speech clear. CNs grossly intact. Moves all extremities appropriately. Strength and sensation grossly intact to all extremities.   Psych: Normal affect/mood, cooperative, memory appropriate.    LAB:  All pertinent labs reviewed and interpreted.  Labs Ordered and Resulted from Time of ED Arrival to Time of ED Departure   COMPREHENSIVE METABOLIC PANEL - Normal       Result Value    Sodium 139      Potassium 3.8      Chloride 103      Carbon Dioxide (CO2) 25      Anion Gap 11      Urea Nitrogen 11.0      Creatinine 0.87      Calcium 8.7  "     Glucose 94      Alkaline Phosphatase 89      AST 24      ALT 28      Protein Total 7.5      Albumin 4.1      Bilirubin Total 0.4      GFR Estimate >90     TROPONIN T, HIGH SENSITIVITY - Normal    Troponin T, High Sensitivity 16     CBC WITH PLATELETS AND DIFFERENTIAL    WBC Count 6.1      RBC Count 5.30      Hemoglobin 16.0      Hematocrit 46.3      MCV 87      MCH 30.2      MCHC 34.6      RDW 12.3      Platelet Count 223      % Neutrophils 49      % Lymphocytes 38      % Monocytes 9      % Eosinophils 3      % Basophils 1      % Immature Granulocytes 0      NRBCs per 100 WBC 0      Absolute Neutrophils 3.0      Absolute Lymphocytes 2.3      Absolute Monocytes 0.5      Absolute Eosinophils 0.2      Absolute Basophils 0.1      Absolute Immature Granulocytes 0.0      Absolute NRBCs 0.0     HEPARIN UNFRACTIONATED ANTI XA LEVEL   CK TOTAL       RADIOLOGY:  XR Chest Port 1 View   Final Result   IMPRESSION: Negative chest.          EKG:  See MDM  I have independently reviewed and interpreted the EKG(s) documented above.         I, Mavis Gill, am serving as a scribe to document services personally performed by Dr. Lorena Maier  based on my observation and the provider's statements to me. I, Lorena Maier MD attest that Mavis Gill is acting in a scribe capacity, has observed my performance of the services and has documented them in accordance with my direction.      Lorena Maier M.D.  Emergency Medicine  [facility] EMERGENCY ROOM  [address] [facility] [id number] [phone]  Dept: [phone]     Lorena Maier MD  09/23/23 2344

## 2023-09-24 NOTE — PLAN OF CARE
"  Problem: Pain Acute  Goal: Optimal Pain Control and Function  Outcome: Progressing  Intervention: Develop Pain Management Plan  Recent Flowsheet Documentation  Taken 9/24/2023 0039 by Marleny Flores RN  Pain Management Interventions:   emotional support   environmental changes  Intervention: Prevent or Manage Pain  Recent Flowsheet Documentation  Taken 9/24/2023 0402 by Marleny Flores RN  Medication Review/Management:   medications reviewed   high-risk medications identified  Taken 9/24/2023 0039 by Marleny Flores RN  Medication Review/Management:   medications reviewed   high-risk medications identified   Goal Outcome Evaluation:       Denies pain or SOB. States he gets an \"intermittent fluttery feeling in his left chest\". Heparin gtt infusing, next anti Xa pending. Remains in NSR on the monitor. BP elevated 160's/80's.                  "

## 2023-09-24 NOTE — PHARMACY-ADMISSION MEDICATION HISTORY
Pharmacist Admission Medication History    Admission medication history is complete. The information provided in this note is only as accurate as the sources available at the time of the update.    Medication reconciliation/reorder completed by provider prior to medication history? No    Information Source(s): Patient via in-person    Pertinent Information:     Changes made to PTA medication list:  Added: all  Deleted: None  Changed: None    Medication Affordability:       Allergies reviewed with patient and updates made in EHR: yes    Medication History Completed By: Juan Hill RPH 9/23/2023 10:05 PM    Prior to Admission medications    Medication Sig Last Dose Taking? Auth Provider Long Term End Date   ibuprofen (ADVIL/MOTRIN) 200 MG tablet Take 400 mg by mouth every 4 hours as needed for pain 9/23/2023 at 0900 Yes Unknown, Entered By History     loratadine (CLARITIN) 10 MG tablet Take 10 mg by mouth daily as needed for allergies Past Month Yes Unknown, Entered By History

## 2023-09-25 ENCOUNTER — APPOINTMENT (OUTPATIENT)
Dept: CARDIOLOGY | Facility: CLINIC | Age: 58
DRG: 313 | End: 2023-09-25
Attending: INTERNAL MEDICINE
Payer: COMMERCIAL

## 2023-09-25 VITALS
RESPIRATION RATE: 16 BRPM | BODY MASS INDEX: 36.66 KG/M2 | HEIGHT: 68 IN | TEMPERATURE: 97.7 F | SYSTOLIC BLOOD PRESSURE: 169 MMHG | DIASTOLIC BLOOD PRESSURE: 96 MMHG | HEART RATE: 103 BPM | OXYGEN SATURATION: 95 % | WEIGHT: 241.9 LBS

## 2023-09-25 LAB
MAGNESIUM SERPL-MCNC: 2.1 MG/DL (ref 1.7–2.3)
POTASSIUM SERPL-SCNC: 4.3 MMOL/L (ref 3.4–5.3)

## 2023-09-25 PROCEDURE — 93350 STRESS TTE ONLY: CPT | Mod: 26 | Performed by: INTERNAL MEDICINE

## 2023-09-25 PROCEDURE — 93018 CV STRESS TEST I&R ONLY: CPT | Performed by: INTERNAL MEDICINE

## 2023-09-25 PROCEDURE — 250N000013 HC RX MED GY IP 250 OP 250 PS 637: Performed by: STUDENT IN AN ORGANIZED HEALTH CARE EDUCATION/TRAINING PROGRAM

## 2023-09-25 PROCEDURE — 84132 ASSAY OF SERUM POTASSIUM: CPT | Performed by: INTERNAL MEDICINE

## 2023-09-25 PROCEDURE — 36415 COLL VENOUS BLD VENIPUNCTURE: CPT | Performed by: INTERNAL MEDICINE

## 2023-09-25 PROCEDURE — 999N000208 ECHO STRESS ECHOCARDIOGRAM

## 2023-09-25 PROCEDURE — 99239 HOSP IP/OBS DSCHRG MGMT >30: CPT | Performed by: STUDENT IN AN ORGANIZED HEALTH CARE EDUCATION/TRAINING PROGRAM

## 2023-09-25 PROCEDURE — 250N000013 HC RX MED GY IP 250 OP 250 PS 637: Performed by: INTERNAL MEDICINE

## 2023-09-25 PROCEDURE — 83735 ASSAY OF MAGNESIUM: CPT | Performed by: INTERNAL MEDICINE

## 2023-09-25 PROCEDURE — 93321 DOPPLER ECHO F-UP/LMTD STD: CPT | Mod: 26 | Performed by: INTERNAL MEDICINE

## 2023-09-25 PROCEDURE — 93352 ADMIN ECG CONTRAST AGENT: CPT | Performed by: INTERNAL MEDICINE

## 2023-09-25 PROCEDURE — 93325 DOPPLER ECHO COLOR FLOW MAPG: CPT | Mod: 26 | Performed by: INTERNAL MEDICINE

## 2023-09-25 PROCEDURE — 93016 CV STRESS TEST SUPVJ ONLY: CPT | Performed by: INTERNAL MEDICINE

## 2023-09-25 PROCEDURE — 255N000002 HC RX 255 OP 636: Performed by: STUDENT IN AN ORGANIZED HEALTH CARE EDUCATION/TRAINING PROGRAM

## 2023-09-25 RX ORDER — NALOXONE HYDROCHLORIDE 0.4 MG/ML
0.2 INJECTION, SOLUTION INTRAMUSCULAR; INTRAVENOUS; SUBCUTANEOUS
Status: DISCONTINUED | OUTPATIENT
Start: 2023-09-25 | End: 2023-09-25 | Stop reason: HOSPADM

## 2023-09-25 RX ORDER — LOSARTAN POTASSIUM 50 MG/1
50 TABLET ORAL DAILY
Qty: 30 TABLET | Refills: 1 | Status: SHIPPED | OUTPATIENT
Start: 2023-09-26

## 2023-09-25 RX ORDER — NALOXONE HYDROCHLORIDE 0.4 MG/ML
0.4 INJECTION, SOLUTION INTRAMUSCULAR; INTRAVENOUS; SUBCUTANEOUS
Status: DISCONTINUED | OUTPATIENT
Start: 2023-09-25 | End: 2023-09-25 | Stop reason: HOSPADM

## 2023-09-25 RX ADMIN — PERFLUTREN 4 ML: 6.52 INJECTION, SUSPENSION INTRAVENOUS at 11:35

## 2023-09-25 RX ADMIN — LOSARTAN POTASSIUM 50 MG: 50 TABLET, FILM COATED ORAL at 08:40

## 2023-09-25 RX ADMIN — ASPIRIN 81 MG: 81 TABLET, COATED ORAL at 08:40

## 2023-09-25 ASSESSMENT — ACTIVITIES OF DAILY LIVING (ADL)
ADLS_ACUITY_SCORE: 20

## 2023-09-25 NOTE — PLAN OF CARE
Pt discharged to home, transported by spouse. Discharge education provided including medication instructions and followup plans. AVS provided. Questions answered, verbalized understanding. PIV removed. All belongings returned and sent home with pt.

## 2023-09-25 NOTE — PLAN OF CARE
Problem: Pain Acute  Goal: Optimal Pain Control and Function  Outcome: Progressing  Intervention: Develop Pain Management Plan  Recent Flowsheet Documentation  Taken 9/24/2023 2026 by Marleny Flores RN  Pain Management Interventions:   medication (see MAR)   emotional support   pillow support provided  Intervention: Prevent or Manage Pain  Recent Flowsheet Documentation  Taken 9/25/2023 0503 by Marleny Flores RN  Medication Review/Management: medications reviewed  Taken 9/24/2023 2323 by Marleny Flores RN  Medication Review/Management: medications reviewed  Taken 9/24/2023 2026 by Marleny Flores RN  Medication Review/Management: medications reviewed   Goal Outcome Evaluation:       Patient with c/o not sleeping well, declined melatonin. Cares clustered. NPO since midnight for stress test today. Monitor shows NSR-SB..

## 2023-09-25 NOTE — DISCHARGE SUMMARY
"[facility]  Hospitalist Discharge Summary      Date of Admission:  9/23/2023  Date of Discharge:  9/25/2023  Discharging Provider: JOHN MCCAIN MD  Discharge Service: Hospitalist Service    Discharge Diagnoses   Musculoskeletal chest pain  Hypertension    Clinically Significant Risk Factors     # Obesity: Estimated body mass index is 36.78 kg/m  as calculated from the following:    Height as of this encounter: 1.727 m (5' 8\").    Weight as of this encounter: 109.7 kg (241 lb 14.4 oz).       Follow-ups Needed After Discharge   Follow-up with primary care physician.  Sleep study    Unresulted Labs Ordered in the Past 30 Days of this Admission       No orders found from 8/24/2023 to 9/24/2023.            Discharge Disposition   Discharged to home  Condition at discharge: Stable    Hospital Course   Mr. Hooper is a 58 years old man with no known significant past medical history who presented to the hospital with 2 to 3 weeks of intermittent chest pain, appears musculoskeletal in nature. Transthoracic echocardiogram without any major abnormalities. Underwent stress test on 9/25 which was negative. Blood pressure elevated and therefore he was started on losartan 50 mg daily by cardiology. Per nursing staff, cleared for discharge by cardiology.      Chest pain most likely musculoskeletal  -2 to 3 weeks of chest pain on the left side.  -Tenderness to palpation  -Increase activity over the last few weeks including playing tennis and weightlifting  -Pain is most consistent with musculoskeletal etiology  -Per documentation, patient had an elevated troponin at the urgent care clinic.  Nevertheless, troponins x3 negative.   -Per documentation, EKG in the ED showed ST segment depression in inferior lateral lead.  I was unable to see this EKG.  EKG in the system without any clear ST segment changes.  -Transthoracic echocardiogram without major abnormalities.  -Patient was started on heparin drip on " presentation.  Discontinued on 9/24.  -Stress test negative.  -Hemoglobin A1c 6.5  -D-dimer negative    Plan  -Continue to monitor symptoms at home, if the patient continues to experience episodes of lightheadedness, needs to follow-up with primary care physician and cardiology for cardiac event monitoring.  At this point, no clear episode of lightheadedness.  Vital stable.      Hypertension  -Does not check blood pressure at home  -Blood pressure elevated on presentation  -Started on losartan 50 mg by cardiology.    Plan  -Discharged on losartan 50 mg daily  -Continue to monitor blood pressure at home.  -Follow-up with primary care physician within the next 2-week and adjust antihypertensives as needed.      Questionable obstructive sleep apnea  -Symptoms suggestive of sleep apnea including snoring, fatigue.    Plan  -Follow-up with primary care physician--> sleep study as an outpatient.    Consultations This Hospital Stay   PHARMACY IP CONSULT  CARDIOLOGY IP CONSULT    Code Status   Full Code    Time Spent on this Encounter   I, JOHN MCCAIN MD, personally saw the patient today and spent greater than 30 minutes discharging this patient.       JOHN MCCAIN MD  [facility] 3 ICU  [address] [place] [id number]  Phone: [phone]  Fax: [phone]  ______________________________________________________________________    Physical Exam   Vital Signs: Temp: 97.7  F (36.5  C) Temp src: Oral BP: (!) 169/96 Pulse: 103   Resp: 16 SpO2: 95 % O2 Device: None (Room air)    Weight: 241 lbs 14.4 oz  Physical Exam  Constitutional:       General: He is not in acute distress.     Appearance: He is ill-appearing. He is not toxic-appearing.   Cardiovascular:      Rate and Rhythm: Normal rate.      Heart sounds: No murmur heard.  Pulmonary:      Effort: Pulmonary effort is normal. No respiratory distress.      Breath sounds: No wheezing.   Musculoskeletal:      Right lower leg: No  edema.      Left lower leg: No edema.      Comments: Point tenderness to palpation of the left pectoralis muscle.   Neurological:      Mental Status: He is alert.   Psychiatric:         Mood and Affect: Mood normal.         Thought Content: Thought content normal.             Primary Care Physician   Physician No Ref-Primary    Discharge Orders   No discharge procedures on file.    Significant Results and Procedures   Most Recent 3 CBC's:  Recent Labs   Lab Test 23  0421 23  2133   WBC 8.8 6.1   HGB 15.9 16.0   MCV 87 87    223     Most Recent 3 BMP's:  Recent Labs   Lab Test 23  0730 23  0421 233   NA  --  139 139   POTASSIUM 4.3 4.4 3.8   CHLORIDE  --  103 103   CO2  --  26 25   BUN  --  9.9 11.0   CR  --  0.86 0.87   ANIONGAP  --  10 11   HANNA  --  8.7 8.7   GLC  --  99 94   ,   Results for orders placed or performed during the hospital encounter of 23   XR Chest Port 1 View    Narrative    EXAM: XR CHEST PORT 1 VIEW  LOCATION: [facility]  DATE: 2023    INDICATION: Chest pain  COMPARISON: 2023      Impression    IMPRESSION: Negative chest.   Echocardiogram Complete     Value    LVEF  60-65%    Narrative    [id number]  [id number]  [id number]  [id number]^KALYN^[place] [address]     Name: EMILY FELTON  MRN: [id number]  : 1965  Study Date: 2023 10:04 AM  Age: 58 yrs  Gender: Male  Patient Location: [facility]  Reason For Study: Chest Pain  Ordering Physician: JOHN MCCAIN  Referring Physician: JOHN MCCAIN  Performed By: MAYELIN     BSA: 2.2 m2  Height: 68 in  Weight: 241 lb  HR: 67  ______________________________________________________________________________  Procedure  Complete Echo Adult. No hemodynamically significant valvular abnormalities on  2D or color flow imaging.  ______________________________________________________________________________  Interpretation  Summary     The left ventricle is normal in size.  Left ventricular function is normal.The ejection fraction is 60-65%.  Normal right ventricle size and systolic function.  The left atrium is mildly dilated.  No hemodynamically significant valvular abnormalities on 2D or color flow  imaging.  ______________________________________________________________________________  Left Ventricle  The left ventricle is normal in size. Left ventricular function is normal.The  ejection fraction is 60-65%. There is normal left ventricular wall thickness.  Left ventricular diastolic function is normal. No regional wall motion  abnormalities noted.     Right Ventricle  Normal right ventricle size and systolic function.     Atria  The left atrium is mildly dilated. Right atrial size is normal. There is no  color Doppler evidence of an atrial shunt.     Mitral Valve  Mitral valve leaflets appear normal. There is no evidence of mitral stenosis  or clinically significant mitral regurgitation.     Tricuspid Valve  The tricuspid valve is not well visualized, but is grossly normal. This degree  of valvular regurgitation is within normal limits.     Aortic Valve  Aortic valve leaflets appear normal. There is no evidence of aortic stenosis  or clinically significant aortic regurgitation.     Pulmonic Valve  The pulmonic valve is not well visualized.     Vessels  The aorta root is normal. Normal size ascending aorta.     Pericardium  There is no pericardial effusion.     ______________________________________________________________________________  MMode/2D Measurements & Calculations  IVSd: 0.95 cm  LVIDd: 5.5 cm  LVIDs: 3.3 cm  LVPWd: 0.96 cm  FS: 39.4 %     LV mass(C)d: 203.1 grams  LV mass(C)dI: 91.8 grams/m2  Ao root diam: 3.1 cm  LA dimension: 4.0 cm  asc Aorta Diam: 3.3 cm  LA/Ao: 1.3  LVOT diam: 2.4 cm  LVOT area: 4.4 cm2  Ao root diam index Ht(cm/m): 1.8  Ao root diam index BSA (cm/m2): 1.4  Asc Ao diam index BSA (cm/m2): 1.5  Asc  Ao diam index Ht(cm/m): 1.9  LA Volume (BP): 54.3 ml  LA Volume Indexed (AL/bp): 25.9 ml/m2     RV Base: 3.8 cm  RWT: 0.35  TAPSE: 2.9 cm     Time Measurements  MM HR: 60.0 BPM     Doppler Measurements & Calculations  MV E max finn: 51.0 cm/sec  MV A max finn: 66.4 cm/sec  MV E/A: 0.77  MV dec slope: 206.9 cm/sec2  MV dec time: 0.25 sec  Ao V2 max: 129.0 cm/sec  Ao max P.0 mmHg  Ao V2 mean: 97.7 cm/sec  Ao mean P.1 mmHg  Ao V2 VTI: 27.5 cm  KENDELL(I,D): 2.5 cm2  KENDELL(V,D): 2.5 cm2  LV V1 max P.2 mmHg  LV V1 max: 73.7 cm/sec  LV V1 VTI: 15.8 cm  SV(LVOT): 69.1 ml  SI(LVOT): 31.3 ml/m2  PA acc time: 0.10 sec  AV Finn Ratio (DI): 0.57  KENDELL Index (cm2/m2): 1.1     E/E' av.5  Lateral E/e': 4.6  Medial E/e': 6.4  RV S Finn: 16.6 cm/sec     ______________________________________________________________________________  Report approved by: Chanel Gutiérrez 2023 02:41 PM         Echocardiogram Exercise Stress    Narrative    [id number]  [id number]  [id number]  [id number]^JOLLY^[place] [address]     Name: EMILY FELTON  MRN: [id number]  : 1965  Study Date: 2023 11:13 AM  Age: 58 yrs  Gender: Male  Patient Location: [facility]  Reason For Study: Chest Pain  Ordering Physician: AGUS AZEVEDO  Performed By: DELMER     BSA: 2.2 m2  Height: 68 in  Weight: 241 lb  HR: 89  BP: 172/115 mmHg  ______________________________________________________________________________  Procedure  Stress Echo Complete. Definity (NDC #19475-326) given intravenously.  ______________________________________________________________________________  Interpretation Summary  1. Normal stress echocardiogram without evidence of stress induced ischemia.  2. Normal resting LV systolic performance with an ejection fraction of 55-60%.  There is normal improvement in left ventricular systolic performance with a  peak ejection fraction of 70-75%.  3. Nondiagnostic EKG for ischemia.  4.  No anginal chest pain reported with exercise.  5. Normal functional capacity for age.     Electrocardiogram: Baseline ECG reveals normal sinus rhythm with non specific  ST-T abnormalities. With exercise, there are no diagnostic EKG changes     Baseline echocardiogram: Technically difficult images were obtained in the  standard quad-screen format. LV systolic performance is normal with a visually  estimated ejection fraction of 55-60%. There is normal regional wall motion.  No significant valvular heart disease is identified on limited screening  Doppler.     Postexercise echocardiogram: Technically difficult images were obtained  immediately post exercise in the standard quad-screen format. LV systolic  performance normally improves with a peak ejection fraction of 70-75%. There  are no stress induced regional wall motion abnormalities.  ______________________________________________________________________________  Stress  RPP [id number].  Exercise was stopped due to fatigue.     Stress Results         Protocol:  Byron Protocol        Maximum Predicted HR:   164 bpm         Target HR: 139 bpm               % Maximum Predicted HR: 101 %                             Stage   DurationHeart Rate  BP                                  (mm:ss)   (bpm)                       peak stress  7:00     166    196/118                        recoveryR  10:26     116    159/118             Stress Duration:   7:00 mm:ss        Recovery Time: 10:26 mm:ss          Maximum Stress HR: 166 bpm           METS:          8     ______________________________________________________________________________  Report approved by: Chanel Valenzuela 09/25/2023 12:14 PM     ______________________________________________________________________________          Discharge Medications   Current Discharge Medication List        CONTINUE these medications which have NOT CHANGED    Details   ibuprofen (ADVIL/MOTRIN) 200 MG tablet Take 400 mg by mouth every 4  hours as needed for pain      loratadine (CLARITIN) 10 MG tablet Take 10 mg by mouth daily as needed for allergies           Allergies   No Known Allergies

## 2023-09-25 NOTE — PROGRESS NOTES
Exercise Stress Echocardiogram Test:  Patient exercised on the treadmill for 7 min 00 sec according to Byron Protocol stopping exercise due to Fatigue. Peak VS: HR= 166, BP= 196/118. MPHR reached= 101%. DPP= 96925. No symptoms of CP produced. Results pending cardiology interpretation.

## 2023-09-26 ENCOUNTER — NURSE TRIAGE (OUTPATIENT)
Dept: NURSING | Facility: CLINIC | Age: 58
End: 2023-09-26
Payer: COMMERCIAL

## 2023-09-26 NOTE — TELEPHONE ENCOUNTER
Patient was recently into ER on 9/23/2023 for hypertension, chest pain.  Patient discharged yesterday.  Today feels that heart rate is racing.   Patient was prescribed Losartan and started the medication today.         Reason for Disposition   Palpitations    Additional Information   Negative: Passed out (i.e., lost consciousness, collapsed and was not responding)   Negative: Shock suspected (e.g., cold/pale/clammy skin, too weak to stand, low BP, rapid pulse)   Negative: Difficult to awaken or acting confused (e.g., disoriented, slurred speech)   Negative: Visible sweat on face or sweat dripping down face   Negative: Unable to walk, or can only walk with assistance (e.g., requires support)   Negative: Received SHOCK from implantable cardiac defibrillator and has persisting symptoms (i.e., palpitations, lightheadedness)   Negative: Dizziness, lightheadedness, or weakness and heart beating very rapidly (e.g., > 140 / minute)   Negative: Dizziness, lightheadedness, or weakness and heart beating very slowly (e.g., < 50 / minute)   Negative: Sounds like a life-threatening emergency to the triager   Negative: Difficulty breathing   Negative: Dizziness, lightheadedness, or weakness   Negative: Heart beating very rapidly (e.g., > 140 / minute) and present now  (Exception: During exercise.)   Negative: Heart beating very slowly (e.g., < 50 / minute)  (Exception: Athlete and heart rate normal for caller.)   Negative: New or worsened shortness of breath with activity (dyspnea on exertion)   Negative: Patient sounds very sick or weak to the triager   Negative: Wearing a 'Holter monitor' or 'cardiac event monitor'   Negative: Received SHOCK from implantable cardiac defibrillator (and now feels well)   Negative: Heart beating very rapidly (e.g., > 140 / minute) and not present now  (Exception: During exercise.)   Negative: Skipped or extra beat(s) and increases with exercise or exertion   Negative: Skipped or extra beat(s) and  occurs 4 or more times per minute   Negative: History of heart disease (i.e., heart attack, bypass surgery, angina, angioplasty)  (Exception: Brief heartbeat symptoms that went away and now feels well.)   Negative: Age > 60 years  (Exception: Brief heartbeat symptoms that went away and now feels well.)   Negative: Taking water pill (i.e., diuretic) or heart medication (e.g., digoxin)   Negative: Patient wants to be seen   Negative: Heart rhythm alert (e.g., 'you have irregular heartbeat') from personal wearable device (e.g., Apple Watch)   Negative: History of hyperthyroidism or taking thyroid medication   Negative: Substance use (drug use) or misuse, known or suspected   Negative: ADHD and taking stimulant medication   Negative: Palpitations and no improvement after following Care Advice   Negative: Problems with anxiety or stress   Negative: Palpitations are a chronic symptom (recurrent or ongoing AND present > 4 weeks)   Negative: Heart beating very slowly (e.g., < 50 / minute) in well-conditioned athlete and that caller says is normal    Protocols used: Heart Rate and Heartbeat [id number]

## 2023-09-27 ENCOUNTER — APPOINTMENT (OUTPATIENT)
Dept: RADIOLOGY | Facility: CLINIC | Age: 58
End: 2023-09-27
Attending: EMERGENCY MEDICINE
Payer: COMMERCIAL

## 2023-09-27 ENCOUNTER — NURSE TRIAGE (OUTPATIENT)
Dept: NURSING | Facility: CLINIC | Age: 58
End: 2023-09-27
Payer: COMMERCIAL

## 2023-09-27 ENCOUNTER — HOSPITAL ENCOUNTER (EMERGENCY)
Facility: CLINIC | Age: 58
Discharge: HOME | End: 2023-09-28
Attending: EMERGENCY MEDICINE | Admitting: EMERGENCY MEDICINE
Payer: COMMERCIAL

## 2023-09-27 DIAGNOSIS — R07.9 CHEST PAIN, UNSPECIFIED TYPE: ICD-10-CM

## 2023-09-27 LAB
ALBUMIN SERPL BCG-MCNC: 4.2 G/DL (ref 3.5–5.2)
ALP SERPL-CCNC: 94 U/L (ref 40–129)
ALT SERPL W P-5'-P-CCNC: 56 U/L (ref 0–70)
ANION GAP SERPL CALCULATED.3IONS-SCNC: 11 MMOL/L (ref 7–15)
AST SERPL W P-5'-P-CCNC: 32 U/L (ref 0–45)
ATRIAL RATE - MUSE: 71 BPM
BASOPHILS # BLD AUTO: 0.1 10E3/UL (ref 0–0.2)
BASOPHILS NFR BLD AUTO: 1 %
BILIRUB SERPL-MCNC: 0.3 MG/DL
BUN SERPL-MCNC: 18.1 MG/DL (ref 6–20)
CALCIUM SERPL-MCNC: 9.1 MG/DL (ref 8.6–10)
CHLORIDE SERPL-SCNC: 104 MMOL/L (ref 98–107)
CREAT SERPL-MCNC: 0.97 MG/DL (ref 0.67–1.17)
DEPRECATED HCO3 PLAS-SCNC: 23 MMOL/L (ref 22–29)
DIASTOLIC BLOOD PRESSURE - MUSE: NORMAL MMHG
EGFRCR SERPLBLD CKD-EPI 2021: >90 ML/MIN/1.73M2
EOSINOPHIL # BLD AUTO: 0.1 10E3/UL (ref 0–0.7)
EOSINOPHIL NFR BLD AUTO: 2 %
ERYTHROCYTE [DISTWIDTH] IN BLOOD BY AUTOMATED COUNT: 12.4 % (ref 10–15)
GLUCOSE SERPL-MCNC: 103 MG/DL (ref 70–99)
HCT VFR BLD AUTO: 47.8 % (ref 40–53)
HGB BLD-MCNC: 16.3 G/DL (ref 13.3–17.7)
IMM GRANULOCYTES # BLD: 0 10E3/UL
IMM GRANULOCYTES NFR BLD: 0 %
INTERPRETATION ECG - MUSE: NORMAL
LYMPHOCYTES # BLD AUTO: 2.4 10E3/UL (ref 0.8–5.3)
LYMPHOCYTES NFR BLD AUTO: 33 %
MCH RBC QN AUTO: 30 PG (ref 26.5–33)
MCHC RBC AUTO-ENTMCNC: 34.1 G/DL (ref 31.5–36.5)
MCV RBC AUTO: 88 FL (ref 78–100)
MONOCYTES # BLD AUTO: 0.7 10E3/UL (ref 0–1.3)
MONOCYTES NFR BLD AUTO: 9 %
NEUTROPHILS # BLD AUTO: 4.1 10E3/UL (ref 1.6–8.3)
NEUTROPHILS NFR BLD AUTO: 55 %
NRBC # BLD AUTO: 0 10E3/UL
NRBC BLD AUTO-RTO: 0 /100
P AXIS - MUSE: 57 DEGREES
PLATELET # BLD AUTO: 239 10E3/UL (ref 150–450)
POTASSIUM SERPL-SCNC: 4.1 MMOL/L (ref 3.4–5.3)
PR INTERVAL - MUSE: 150 MS
PROT SERPL-MCNC: 7.6 G/DL (ref 6.4–8.3)
QRS DURATION - MUSE: 86 MS
QT - MUSE: 386 MS
QTC - MUSE: 419 MS
R AXIS - MUSE: 1 DEGREES
RBC # BLD AUTO: 5.44 10E6/UL (ref 4.4–5.9)
SODIUM SERPL-SCNC: 138 MMOL/L (ref 135–145)
SYSTOLIC BLOOD PRESSURE - MUSE: NORMAL MMHG
T AXIS - MUSE: 9 DEGREES
TROPONIN T SERPL HS-MCNC: 11 NG/L
VENTRICULAR RATE- MUSE: 71 BPM
WBC # BLD AUTO: 7.4 10E3/UL (ref 4–11)

## 2023-09-27 PROCEDURE — 80053 COMPREHEN METABOLIC PANEL: CPT | Performed by: EMERGENCY MEDICINE

## 2023-09-27 PROCEDURE — 84484 ASSAY OF TROPONIN QUANT: CPT | Performed by: EMERGENCY MEDICINE

## 2023-09-27 PROCEDURE — 85025 COMPLETE CBC W/AUTO DIFF WBC: CPT | Performed by: EMERGENCY MEDICINE

## 2023-09-27 PROCEDURE — 99285 EMERGENCY DEPT VISIT HI MDM: CPT | Mod: 25

## 2023-09-27 PROCEDURE — 71046 X-RAY EXAM CHEST 2 VIEWS: CPT

## 2023-09-27 PROCEDURE — 93005 ELECTROCARDIOGRAM TRACING: CPT | Performed by: EMERGENCY MEDICINE

## 2023-09-27 PROCEDURE — 36415 COLL VENOUS BLD VENIPUNCTURE: CPT | Performed by: EMERGENCY MEDICINE

## 2023-09-27 ASSESSMENT — ACTIVITIES OF DAILY LIVING (ADL): ADLS_ACUITY_SCORE: 35

## 2023-09-27 ASSESSMENT — ENCOUNTER SYMPTOMS
CHEST TIGHTNESS: 1
PALPITATIONS: 1

## 2023-09-27 NOTE — TELEPHONE ENCOUNTER
Patient seen at [facility] this weekend due to feelings in his chest. Patient had a positive troponin and sent to the ED and then admitted and was in for 2 days.   EKG, Echo, Stress test completed and all negative and patient discharged. Patient directed to follow up with cardiology. Patient blood pressure was elevated and started on medication. Patient called yesterday with his heart racing but had not started his medication yet. Patient directed to take his medication and then check back in. Patient took his losartan and his pulse is settling into the 70-80s. When patient is active it goes away.   Patient calling with questions regarding intermittent tingling in his left hand. It is not constant. When he is active is not aware of it or it is not happening. Patient unsure if if is related to arm position. Patient also notes recently straining his left pectoralis muscle.   Denies weakness, numbness  Patient has visit with PCP tomorrow.  Patient will discuss symptom at visit tomorrow.  Care advice given. Patient will call back with worsening symptoms.   Jenna Antoine RN   09/27/23 8:51 AM  [facility] Nurse Advisor        Reason for Disposition   Numbness or tingling on both sides of body and is a new symptom lasting > 24 hours     Just left hand, not happening now, intermittent. Started yesterday or maybe the day before.    Additional Information   Negative: Difficult to awaken or acting confused (e.g., disoriented, slurred speech)   Negative: New neurologic deficit that is present NOW, sudden onset of ANY of the following: * Weakness of the face, arm, or leg on one side of the body* Numbness of the face, arm, or leg on one side of the body* Loss of speech or garbled speech   Negative: Sounds like a life-threatening emergency to the triager   Negative: SEVERE weakness (i.e., unable to walk or barely able to walk, requires support) and new-onset or worsening   Negative: Confusion, disorientation, or  hallucinations is main symptom   Negative: Dizziness is main symptom   Negative: Followed a head injury within last 3 days   Negative: Headache (with neurologic deficit)   Negative: Unable to urinate (or only a few drops) and bladder feels very full   Negative: Loss of bladder or bowel control (urine or bowel incontinence; wetting self, leaking stool) of new-onset   Negative: Back pain with numbness (loss of sensation) in groin or rectal area   Negative: Neurologic deficit that was brief (now gone), ANY of the following: * Weakness of the face, arm, or leg on one side of the body * Numbness of the face, arm, or leg on one side of the body * Loss of speech or garbled speech   Negative: Patient sounds very sick or weak to the triager   Negative: Neurologic deficit of gradual onset (e.g., days to weeks), ANY of the following: * Weakness of the face, arm, or leg on one side of the body* Numbness of the face, arm, or leg on one side of the body* Loss of speech or garbled speech   Negative: [employer] palsy suspected (i.e., weakness only one side of the face, developing over hours to days, no other symptoms)   Negative: Tingling (e.g., pins and needles) of the face, arm or leg on one side of the body, that is present now (Exceptions: Chronic or recurrent symptom lasting > 4 weeks; or tingling from known cause, such as: bumped elbow, carpal tunnel syndrome, pinched nerve, frostbite.)   Negative: Neck pain (with neurologic deficit)   Negative: Back pain (with neurologic deficit)   Negative: Patient wants to be seen   Negative: Loss of speech or garbled speech is a chronic symptom (recurrent or ongoing problem lasting > 4 weeks)   Negative: Weakness of arm or leg is a chronic symptom (recurrent or ongoing problem lasting > 4 weeks)   Negative: Numbness or tingling in one or both hands is a chronic symptom (recurrent or ongoing problem lasting > 4 weeks)   Negative: Numbness or tingling in one or both feet is a chronic symptom  (recurrent or ongoing problem lasting > 4 weeks)    Protocols used: Neurologic Deficit-A-OH

## 2023-09-28 VITALS
SYSTOLIC BLOOD PRESSURE: 178 MMHG | HEART RATE: 68 BPM | TEMPERATURE: 97 F | DIASTOLIC BLOOD PRESSURE: 98 MMHG | RESPIRATION RATE: 21 BRPM | OXYGEN SATURATION: 96 %

## 2023-09-28 LAB — TROPONIN T SERPL HS-MCNC: 10 NG/L

## 2023-09-28 PROCEDURE — 84484 ASSAY OF TROPONIN QUANT: CPT

## 2023-09-28 PROCEDURE — 36415 COLL VENOUS BLD VENIPUNCTURE: CPT

## 2023-09-28 ASSESSMENT — ACTIVITIES OF DAILY LIVING (ADL): ADLS_ACUITY_SCORE: 35

## 2023-09-28 NOTE — ED NOTES
AVS reviewed with pt. Education provided on worsening symptoms to watch out for, medication administration for hypertension, and follow-up with PCP. All questions were answered.

## 2023-09-28 NOTE — ED PROVIDER NOTES
EMERGENCY DEPARTMENT ENCOUNTER      NAME: Mark Hooper  AGE: 58 year old male  YOB: 1965  MRN: [id number]  EVALUATION DATE & TIME: No admission date for patient encounter.    PCP: No Ref-Primary, Physician    ED PROVIDER: Jimena Mortensen PA-C      Chief Complaint   Patient presents with    Chest Pain     FINAL IMPRESSION:  1. Chest pain, unspecified type      ED COURSE & MEDICAL DECISION MAKING:    Pertinent Labs & Imaging studies reviewed. (See chart for details)  58 year old male presents to the Emergency Department for evaluation of chest pain.  Per chart review patient was admitted to [facility] from 9/23 to 9/25 for chest pain.  Patient had a echocardiogram which was unremarkable.  Patient underwent a stress test on 9/25 which was also negative.  Patient was started on losartan.  For the past 4 days patient has had left-sided chest wall pain.  Patient noticed increased chest tightness today while driving home.  He is noted in the past 2 days his heart rate has spiked to the 150s and then return to the 70s.He denies fever, chills, shortness of breath.  Patient has an appointment with his primary care doctor tomorrow as well as a cardiology appointment next week.  Vital signs reviewed patient is hypertensive most likely secondary due to pain and anxiety.  Afebrile.  On exam chest wall is nontender to palpation.  Cardiac and pulmonary exams unremarkable.  No rash.    Differential diagnosis includes pneumothorax, hemothorax, ACS, pericarditis, myocarditis, anxiety.  CBC is unremarkable.  No white blood cell elevation.  CMP is unremarkable.  Initial troponin is 11.  Delta troponin is pending.  EKG showed no STEMI.  Chest x-ray shows mild stable elevation right hemidiaphragm.  Mild low lung volumes.  Heart size and pulmonary vascularity within normal limits.  No focal lung infiltrates.  Osseous structures grossly intact.  Visualized upper abdominal unremarkable.  Patient was educated on his  imaging and lab results.  Patient be signed out to Dr. Aponte pending delta troponin. All questions answered.  Patient agrees with plan.      ED COURSE:   10:06 PM I staffed the patient with Lindsey Davis MD.  10:12 PM I saw the patient.   12:58 AM Patient will be signed out to Dr. Aponte.        At the conclusion of the encounter I discussed the results of all of the tests and the disposition. The questions were answered. The patient or family acknowledged understanding and was agreeable with the care plan.     0 minutes of critical care time       Additional Documentation    History:  Supplemental history from: Documented in chart, if applicable  External Record(s) reviewed: Documented in chart, if applicable. and Inpatient Record: Patient was admitted to [facility] from 9/23-9/25. See [facility] for more information.    Work Up:  Chart documentation includes differential considered and any EKGs or imaging interpreted by provider.  In additional to work up documented, I considered the following work up: Documented in chart, if applicable.    External consultation:  Discussion of management with another provider: Documented in chart, if applicable    Complicating factors:  Care impacted by chronic illness: N/A  Care affected by social determinants of health: N/A    Disposition considerations:  Signed out      MEDICATIONS GIVEN IN THE EMERGENCY:  Medications - No data to display    NEW PRESCRIPTIONS STARTED AT TODAY'S ER VISIT  New Prescriptions    No medications on file          =================================================================    [facility]    Patient information was obtained from: Patient    Use of : N/A       Mark Hooper is a 58 year old male with nopertinent history who presents to this ED by private car for evaluation of chest pain.    Per chart review, patient was admitted to [facility] from 9/23-9/25, with a concern of chest pain.  "Transthoracic echocardiogram without any major abnormalities. Underwent stress test on 9/25 which was negative. Blood pressure elevated and therefore he was started on losartan 50 mg daily by cardiology. Per nursing staff, cleared for discharge by cardiology.     Patient reports an onset of palpitations which occurred this month. He describes this as a \"fluttery feeling\". He endorses tenderness to his left chest which occurred on 9/23 (~4 days prior). He is unsure if his symptoms are due to anxiety or a panic attack. Patient took two tablets of lisinopril on 9/26 (~1 day ago). He noticed his heart rate would elevate between 149 to 141 and lower between the 70-80's. He felt \"tightness\" when driving home today. He hasn't checked his blood pressure. Patient has a follow up with his primary care provider tomorrow and cardiologist next week. No other medical concerns.     REVIEW OF SYSTEMS   Review of Systems   Respiratory:  Positive for chest tightness.    Cardiovascular:  Positive for chest pain (left chest wall) and palpitations.   All other systems reviewed and are negative.       PAST MEDICAL HISTORY:  History reviewed. No pertinent past medical history.    PAST SURGICAL HISTORY:  History reviewed. No pertinent surgical history.        CURRENT MEDICATIONS:    ibuprofen (ADVIL/MOTRIN) 200 MG tablet  loratadine (CLARITIN) 10 MG tablet  losartan (COZAAR) 50 MG tablet        ALLERGIES:  Allergies   Allergen Reactions    Pollen Extract Unknown       FAMILY HISTORY:  History reviewed. No pertinent family history.    SOCIAL HISTORY:   Social History     Socioeconomic History    Marital status:    Tobacco Use    Smoking status: Never    Smokeless tobacco: Never       VITALS:  BP (!) 158/100   Pulse 71   Temp 97  F (36.1  C)   Resp 24   SpO2 95%     PHYSICAL EXAM    Physical Exam  Vitals and nursing note reviewed.   Constitutional:       Appearance: Normal appearance.   HENT:      Head: Atraumatic.      Right Ear: " External ear normal.      Left Ear: External ear normal.      Nose: Nose normal.      Mouth/Throat:      Mouth: Mucous membranes are moist.   Eyes:      Conjunctiva/sclera: Conjunctivae normal.      Pupils: Pupils are equal, round, and reactive to light.   Cardiovascular:      Rate and Rhythm: Normal rate and regular rhythm.      Pulses: Normal pulses.      Heart sounds: Normal heart sounds. No murmur heard.     No friction rub. No gallop.   Pulmonary:      Effort: Pulmonary effort is normal. No tachypnea, accessory muscle usage or respiratory distress.      Breath sounds: Normal breath sounds. No stridor. No decreased breath sounds, wheezing or rales.   Chest:      Chest wall: No mass, deformity, tenderness, crepitus or edema. There is no dullness to percussion.   Abdominal:      General: Bowel sounds are normal.      Palpations: Abdomen is soft.      Tenderness: There is no abdominal tenderness. There is no guarding or rebound.   Musculoskeletal:      Cervical back: Normal range of motion.   Skin:     General: Skin is dry.   Neurological:      Mental Status: He is alert. Mental status is at baseline.   Psychiatric:         Mood and Affect: Mood normal.         Thought Content: Thought content normal.          LAB:  All pertinent labs reviewed and interpreted.  Labs Ordered and Resulted from Time of ED Arrival to Time of ED Departure   COMPREHENSIVE METABOLIC PANEL - Abnormal       Result Value    Sodium 138      Potassium 4.1      Carbon Dioxide (CO2) 23      Anion Gap 11      Urea Nitrogen 18.1      Creatinine 0.97      GFR Estimate >90      Calcium 9.1      Chloride 104      Glucose 103 (*)     Alkaline Phosphatase 94      AST 32      ALT 56      Protein Total 7.6      Albumin 4.2      Bilirubin Total 0.3     TROPONIN T, HIGH SENSITIVITY - Normal    Troponin T, High Sensitivity 11     TROPONIN T, HIGH SENSITIVITY - Normal    Troponin T, High Sensitivity 10     CBC WITH PLATELETS AND DIFFERENTIAL    WBC Count 7.4       RBC Count 5.44      Hemoglobin 16.3      Hematocrit 47.8      MCV 88      MCH 30.0      MCHC 34.1      RDW 12.4      Platelet Count 239      % Neutrophils 55      % Lymphocytes 33      % Monocytes 9      % Eosinophils 2      % Basophils 1      % Immature Granulocytes 0      NRBCs per 100 WBC 0      Absolute Neutrophils 4.1      Absolute Lymphocytes 2.4      Absolute Monocytes 0.7      Absolute Eosinophils 0.1      Absolute Basophils 0.1      Absolute Immature Granulocytes 0.0      Absolute NRBCs 0.0          RADIOLOGY:  Reviewed all pertinent imaging. Please see official radiology report.  XR Chest 2 Views   Final Result   IMPRESSION:  Mild stable elevation right hemidiaphragm. Mild low lung volumes. Heart size and pulmonary vascularity within normal limits. No focal lung infiltrates. Osseous structures grossly intact. Visualized upper abdomen unremarkable.        EKG 2150 with normal sinus rhythm without ST changes, HR 71, same as piror EKG from 923/2023 Reviewed by Dr. Davis.        I, Miguel Goodman, am serving as a scribe to document services personally performed by Jimena Mortensen PA-C, based on my observation and the provider's statements to me. I, Jimena Mortensen PA-C, attest that Miguel Goodman is acting in a scribe capacity, has observed my performance of the services and has documented them in accordance with my direction.    Jimena Mortensen PA-C  [facility] EMERGENCY ROOM  [address] [address]  [phone]     Jimena Mortensen PA-C  09/28/23 0105

## 2023-09-28 NOTE — ED PROVIDER NOTES
Emergency Department Midlevel Supervisory Note     I personally saw the patient and performed a substantive portion of the visit including all aspects of the medical decision making.    ED Course:  10:06 PM Jimena Mortensen PA-C staffed patient with me. I agree with their assessment and plan of management, and I will see the patient. I met with the patient to introduce myself, gather additional history, perform my initial exam, and discuss the plan.     Brief HPI:     Mark Hooper is a 58 year old male who presents for evaluation of chest pain.    I, Shirin Mondragon, am serving as a scribe to document services personally performed by Lindsey Davis MD, based on my observations and the provider's statements to me.   I, Lindsey Davis MD attest that Shirin Mondragon was acting in a scribe capacity, has observed my performance of the services and has documented them in accordance with my direction.    Brief Physical Exam: BP (!) 185/103   Pulse 73   Temp 97  F (36.1  C)   Resp 18   SpO2 99%   Constitutional:  Alert, in no acute distress  EYES: Conjunctivae clear  HENT:  Atraumatic, normocephalic  Respiratory:  Respirations even, unlabored, in no acute respiratory distress  Cardiovascular:  Regular rate and rhythm, good peripheral perfusion  GI: Soft, nondistended, nontender, no palpable masses, no rebound, no guarding   Musculoskeletal:  No edema. No cyanosis. Range of motion major extremities intact.    Integument: Warm, Dry, No erythema, No rash.   Neurologic:  Alert & oriented, no focal deficits noted  Psych: Normal mood and affect     MDM:  PT with low risk HEART score with recurrent chest pain and anxiety, examination and EKG reassuring, amenable to troponin, CXR, CBC/chemistry and r/o ACS per high sensitivity troponin screening protocol for ACS.     EKG 2150 with normal sinus rhythm without ST changes, HR 71, same as [facility] EKG from 923/2023        Lindsey Davis MD  [facility]  EMERGENCY ROOM  [address] [place] [id number]  [phone]      Lindsey Davis MD  09/27/23 2225

## 2023-09-28 NOTE — ED TRIAGE NOTES
Pt has been seen for chest pain over the last few weeks with multiple providers. Has had this chest pain before, but tonight he is experiencing chest tightness. Pt reports that he is feeling anxious. Pain 2/10.   He was admitted this weekend because his troponin has been elevated, was here for echo and stress test that ruled out no abnormalities according to the pt.      Triage Assessment       Row Name 09/27/23 4794       Triage Assessment (Adult)    Airway WDL WDL       Respiratory WDL    Respiratory WDL WDL       Skin Circulation/Temperature WDL    Skin Circulation/Temperature WDL WDL       Cardiac WDL    Cardiac WDL chest pain       Chest Pain Assessment    Chest Pain Location midsternal    Character tightness       Peripheral/Neurovascular WDL    Peripheral Neurovascular WDL WDL       Cognitive/Neuro/Behavioral WDL    Cognitive/Neuro/Behavioral WDL WDL

## 2023-09-28 NOTE — ED NOTES
EMERGENCY DEPARTMENT SIGN OUT NOTE        ED COURSE AND MEDICAL DECISION MAKING  Patient was signed out to me by Jimena Mortensen PA-C at 12:15 AM    In brief, Mark Hooper is a 58 year old male who initially presented with chest pain.    She has been experiencing fluttering palpitations within this months. She developed an onset of left chest wall pain on 09/23. He took 2 tables of lisinopril yesterday. He noticed his heart rate would elevate around 140s and decrease between the 70s-80s. He has a follow-up with his PCP tomorrow and cardiologist next week.       At time of sign out, disposition was pending second troponin.    Repeat troponin negative.  Discussed signs with the patient.  Does have significant anxiety.  We will discuss this with his physician in the morning.  Will return for worsening symptoms.    FINAL IMPRESSION    1. Chest pain, unspecified type        ED MEDS  Medications - No data to display    LAB  Labs Ordered and Resulted from Time of ED Arrival to Time of ED Departure   COMPREHENSIVE METABOLIC PANEL - Abnormal       Result Value    Sodium 138      Potassium 4.1      Carbon Dioxide (CO2) 23      Anion Gap 11      Urea Nitrogen 18.1      Creatinine 0.97      GFR Estimate >90      Calcium 9.1      Chloride 104      Glucose 103 (*)     Alkaline Phosphatase 94      AST 32      ALT 56      Protein Total 7.6      Albumin 4.2      Bilirubin Total 0.3     TROPONIN T, HIGH SENSITIVITY - Normal    Troponin T, High Sensitivity 11     TROPONIN T, HIGH SENSITIVITY - Normal    Troponin T, High Sensitivity 10     CBC WITH PLATELETS AND DIFFERENTIAL    WBC Count 7.4      RBC Count 5.44      Hemoglobin 16.3      Hematocrit 47.8      MCV 88      MCH 30.0      MCHC 34.1      RDW 12.4      Platelet Count 239      % Neutrophils 55      % Lymphocytes 33      % Monocytes 9      % Eosinophils 2      % Basophils 1      % Immature Granulocytes 0      NRBCs per 100 WBC 0      Absolute Neutrophils 4.1      Absolute  Lymphocytes 2.4      Absolute Monocytes 0.7      Absolute Eosinophils 0.1      Absolute Basophils 0.1      Absolute Immature Granulocytes 0.0      Absolute NRBCs 0.0         EKG      RADIOLOGY    XR Chest 2 Views   Final Result   IMPRESSION:  Mild stable elevation right hemidiaphragm. Mild low lung volumes. Heart size and pulmonary vascularity within normal limits. No focal lung infiltrates. Osseous structures grossly intact. Visualized upper abdomen unremarkable.          DISCHARGE MEDS  New Prescriptions    No medications on file       Javier Aponte MD  [facility] EMERGENCY ROOM  [address] [facility] [address] [phone]       Javier Aponte MD  09/28/23 0103

## 2023-10-05 ENCOUNTER — HOSPITAL ENCOUNTER (EMERGENCY)
Facility: CLINIC | Age: 58
Discharge: HOME | End: 2023-10-05
Admitting: PHYSICIAN ASSISTANT
Payer: COMMERCIAL

## 2023-10-05 VITALS
TEMPERATURE: 98.2 F | HEIGHT: 68 IN | WEIGHT: 235 LBS | SYSTOLIC BLOOD PRESSURE: 140 MMHG | DIASTOLIC BLOOD PRESSURE: 95 MMHG | OXYGEN SATURATION: 96 % | RESPIRATION RATE: 19 BRPM | BODY MASS INDEX: 35.61 KG/M2 | HEART RATE: 79 BPM

## 2023-10-05 DIAGNOSIS — R07.89 CHEST DISCOMFORT: ICD-10-CM

## 2023-10-05 PROBLEM — J30.1 SEASONAL ALLERGIC RHINITIS DUE TO POLLEN: Status: ACTIVE | Noted: 2023-10-05

## 2023-10-05 LAB
ALBUMIN SERPL BCG-MCNC: 4.4 G/DL (ref 3.5–5.2)
ALP SERPL-CCNC: 92 U/L (ref 40–129)
ALT SERPL W P-5'-P-CCNC: 38 U/L (ref 0–70)
ANION GAP SERPL CALCULATED.3IONS-SCNC: 9 MMOL/L (ref 7–15)
AST SERPL W P-5'-P-CCNC: 23 U/L (ref 0–45)
ATRIAL RATE - MUSE: 102 BPM
BASO+EOS+MONOS # BLD AUTO: NORMAL 10*3/UL
BASO+EOS+MONOS NFR BLD AUTO: NORMAL %
BASOPHILS # BLD AUTO: 0.1 10E3/UL (ref 0–0.2)
BASOPHILS NFR BLD AUTO: 1 %
BILIRUB SERPL-MCNC: 0.5 MG/DL
BUN SERPL-MCNC: 16.1 MG/DL (ref 6–20)
CALCIUM SERPL-MCNC: 9.4 MG/DL (ref 8.6–10)
CHLORIDE SERPL-SCNC: 102 MMOL/L (ref 98–107)
CREAT SERPL-MCNC: 1.06 MG/DL (ref 0.67–1.17)
D DIMER PPP FEU-MCNC: <=0.27 UG/ML FEU (ref 0–0.5)
DEPRECATED HCO3 PLAS-SCNC: 28 MMOL/L (ref 22–29)
DIASTOLIC BLOOD PRESSURE - MUSE: 78 MMHG
EGFRCR SERPLBLD CKD-EPI 2021: 82 ML/MIN/1.73M2
EOSINOPHIL # BLD AUTO: 0.1 10E3/UL (ref 0–0.7)
EOSINOPHIL NFR BLD AUTO: 1 %
ERYTHROCYTE [DISTWIDTH] IN BLOOD BY AUTOMATED COUNT: 12.3 % (ref 10–15)
FLUAV RNA SPEC QL NAA+PROBE: NEGATIVE
FLUBV RNA RESP QL NAA+PROBE: NEGATIVE
GLUCOSE SERPL-MCNC: 102 MG/DL (ref 70–99)
HCT VFR BLD AUTO: 50 % (ref 40–53)
HGB BLD-MCNC: 17.1 G/DL (ref 13.3–17.7)
IMM GRANULOCYTES # BLD: 0 10E3/UL
IMM GRANULOCYTES NFR BLD: 0 %
INTERPRETATION ECG - MUSE: NORMAL
LYMPHOCYTES # BLD AUTO: 1.7 10E3/UL (ref 0.8–5.3)
LYMPHOCYTES NFR BLD AUTO: 22 %
MAGNESIUM SERPL-MCNC: 2.3 MG/DL (ref 1.7–2.3)
MCH RBC QN AUTO: 30.1 PG (ref 26.5–33)
MCHC RBC AUTO-ENTMCNC: 34.2 G/DL (ref 31.5–36.5)
MCV RBC AUTO: 88 FL (ref 78–100)
MONOCYTES # BLD AUTO: 0.6 10E3/UL (ref 0–1.3)
MONOCYTES NFR BLD AUTO: 8 %
NEUTROPHILS # BLD AUTO: 5.2 10E3/UL (ref 1.6–8.3)
NEUTROPHILS NFR BLD AUTO: 68 %
NRBC # BLD AUTO: 0 10E3/UL
NRBC BLD AUTO-RTO: 0 /100
NT-PROBNP SERPL-MCNC: <36 PG/ML (ref 0–900)
P AXIS - MUSE: 45 DEGREES
PLATELET # BLD AUTO: 262 10E3/UL (ref 150–450)
POTASSIUM SERPL-SCNC: 4 MMOL/L (ref 3.4–5.3)
PR INTERVAL - MUSE: 136 MS
PROT SERPL-MCNC: 7.8 G/DL (ref 6.4–8.3)
QRS DURATION - MUSE: 82 MS
QT - MUSE: 318 MS
QTC - MUSE: 414 MS
R AXIS - MUSE: 2 DEGREES
RBC # BLD AUTO: 5.68 10E6/UL (ref 4.4–5.9)
RSV RNA SPEC NAA+PROBE: NEGATIVE
SARS-COV-2 RNA RESP QL NAA+PROBE: NEGATIVE
SODIUM SERPL-SCNC: 139 MMOL/L (ref 135–145)
SYSTOLIC BLOOD PRESSURE - MUSE: 129 MMHG
T AXIS - MUSE: 59 DEGREES
TROPONIN T SERPL HS-MCNC: 11 NG/L
TROPONIN T SERPL HS-MCNC: 11 NG/L
TSH SERPL DL<=0.005 MIU/L-ACNC: 1.99 UIU/ML (ref 0.3–4.2)
VENTRICULAR RATE- MUSE: 102 BPM
WBC # BLD AUTO: 7.6 10E3/UL (ref 4–11)

## 2023-10-05 PROCEDURE — 87637 SARSCOV2&INF A&B&RSV AMP PRB: CPT | Performed by: EMERGENCY MEDICINE

## 2023-10-05 PROCEDURE — 93005 ELECTROCARDIOGRAM TRACING: CPT | Performed by: EMERGENCY MEDICINE

## 2023-10-05 PROCEDURE — 84484 ASSAY OF TROPONIN QUANT: CPT | Mod: 91 | Performed by: EMERGENCY MEDICINE

## 2023-10-05 PROCEDURE — 250N000013 HC RX MED GY IP 250 OP 250 PS 637: Performed by: PHYSICIAN ASSISTANT

## 2023-10-05 PROCEDURE — 83735 ASSAY OF MAGNESIUM: CPT | Performed by: EMERGENCY MEDICINE

## 2023-10-05 PROCEDURE — 85379 FIBRIN DEGRADATION QUANT: CPT | Performed by: EMERGENCY MEDICINE

## 2023-10-05 PROCEDURE — 84443 ASSAY THYROID STIM HORMONE: CPT | Performed by: EMERGENCY MEDICINE

## 2023-10-05 PROCEDURE — 36415 COLL VENOUS BLD VENIPUNCTURE: CPT | Performed by: PHYSICIAN ASSISTANT

## 2023-10-05 PROCEDURE — 84484 ASSAY OF TROPONIN QUANT: CPT | Performed by: PHYSICIAN ASSISTANT

## 2023-10-05 PROCEDURE — 85025 COMPLETE CBC W/AUTO DIFF WBC: CPT | Performed by: EMERGENCY MEDICINE

## 2023-10-05 PROCEDURE — 80053 COMPREHEN METABOLIC PANEL: CPT | Performed by: EMERGENCY MEDICINE

## 2023-10-05 PROCEDURE — 36415 COLL VENOUS BLD VENIPUNCTURE: CPT | Performed by: EMERGENCY MEDICINE

## 2023-10-05 PROCEDURE — 99284 EMERGENCY DEPT VISIT MOD MDM: CPT

## 2023-10-05 PROCEDURE — 83880 ASSAY OF NATRIURETIC PEPTIDE: CPT | Performed by: EMERGENCY MEDICINE

## 2023-10-05 RX ORDER — HYDROXYZINE HYDROCHLORIDE 25 MG/1
25 TABLET, FILM COATED ORAL 3 TIMES DAILY PRN
Qty: 42 TABLET | Refills: 0 | Status: SHIPPED | OUTPATIENT
Start: 2023-10-05 | End: 2023-10-19

## 2023-10-05 RX ORDER — LIDOCAINE 4 G/G
1 PATCH TOPICAL ONCE
Status: DISCONTINUED | OUTPATIENT
Start: 2023-10-05 | End: 2023-10-05 | Stop reason: HOSPADM

## 2023-10-05 RX ORDER — HYDROXYZINE HCL 25 MG
12.5 TABLET ORAL ONCE
Status: COMPLETED | OUTPATIENT
Start: 2023-10-05 | End: 2023-10-05

## 2023-10-05 RX ORDER — LIDOCAINE 4 G/G
1 PATCH TOPICAL EVERY 24 HOURS
Qty: 5 PATCH | Refills: 0 | Status: SHIPPED | OUTPATIENT
Start: 2023-10-05 | End: 2023-10-10

## 2023-10-05 RX ADMIN — HYDROXYZINE HYDROCHLORIDE 12.5 MG: 25 TABLET, FILM COATED ORAL at 17:16

## 2023-10-05 RX ADMIN — LIDOCAINE 1 PATCH: 4 PATCH TOPICAL at 14:54

## 2023-10-05 ASSESSMENT — ACTIVITIES OF DAILY LIVING (ADL)
ADLS_ACUITY_SCORE: 35
ADLS_ACUITY_SCORE: 35

## 2023-10-05 NOTE — DISCHARGE INSTRUCTIONS
You were seen in the emergency department for chest discomfort.  Thankfully, your labs are reassuring.  Continue with your losartan as previously prescribed.  You may use the lidocaine patches as needed for those areas of muscle soreness.  You may also alternate using the patches and Voltaren gel with ice.    I have given you a short supply of hydroxyzine to use when you are experiencing symptoms as it is possible these are related to your increased stress and anxiety over the last several months.    Return to ED if you experience shortness of breath, chest pressure/pain

## 2023-10-05 NOTE — ED TRIAGE NOTES
"Arrives to ED with c/o \"fluttering\" to L side of chest. Intermittent. Seen at [facility] 1.5 weeks ago. Trop elevated and sent to ED for further eval. Trop negative in ED. Has cardiology appt scheduled for tomorrow am. Denies CP at this time. Denies SOB. Tachy in 110s.      Triage Assessment       Row Name 10/05/23 [id number]       Triage Assessment (Adult)    Airway WDL WDL       Respiratory WDL    Respiratory WDL WDL       Skin Circulation/Temperature WDL    Skin Circulation/Temperature WDL WDL       Cardiac WDL    Cardiac WDL X;rhythm    Pulse Rate & Regularity tachycardic       Peripheral/Neurovascular WDL    Peripheral Neurovascular WDL WDL       Cognitive/Neuro/Behavioral WDL    Cognitive/Neuro/Behavioral WDL WDL                    "

## 2023-10-05 NOTE — ED PROVIDER NOTES
EMERGENCY DEPARTMENT ENCOUNTER      NAME: Mark Hooper  AGE: 58 year old male  YOB: 1965  MRN: [id number]  EVALUATION DATE & TIME: No admission date for patient encounter.    PCP: No Ref-Primary, Physician    ED PROVIDER: Patricia Watters PA-C      Chief Complaint   Patient presents with    Palpitations       FINAL IMPRESSION:  1. Chest discomfort        MEDICAL DECISION MAKING:    Pertinent Labs & Imaging studies reviewed. (See chart for details)  58 year old male with a h/o non specific chest pain with recent hospitalization for elevated troponin at outside hospital and cardiac admission with normal echo and normal stress test presents to the Emergency Department for evaluation of recurrence of same chest discomfort with addition of elevated heartrate at home today. On exam he is alert, non toxic appearing and in no acute distress.  Vitals are WNL.  I am able to elicit chest discomfort with direct palpation in the left mid axilla as well as left anterior chest just inferior to the nipple.  Lungs sound clear without crackle or wheeze.  No heart murmur or arrhythmia appreciated.  Radial pulses are equal.  No epigastric abdominal discomfort.  No lower extremity edema.    Differential diagnosis includes ACS, cardiac arrhythmia,, pericarditis, myocarditis, aortic dissection, PE, pneumothorax, muscle strain, electrolyte derangement, anxiety.  CBC and BMP WNL.  Initial troponin 11 and 2 hour delta reassuring at 11 . TSH WNL. D dimer negative. BNP normal. Tried lidocaine patch with mild relief. He does later disclose several increased life stressors in the last 2-3 months. He wonders if this discomfort could be associated with anxiety. Agrees to trial hydroxyzine here which provided some relief. Discussed in depth with patient that findings are very reassuring that his discomfort is not cardiac related. Will discharge to home with additional lidocaine patches as well as trial of hydroxyzine. He has  follow up with cardiology tomorrow to further discuss holter monitor. Encouraged him to also see primary care. Suspect element of anxiety with significant life stressors ([employer] d/t workforce reduction, death of father, moving 3 children out of the  home) as contributing to his sxs.    There is no evidence of acute or emergent process requiring intervention at this time. Pt is appropriate for outpatient management. Provisional nature of today's diagnosis was discussed and strict return precautions were given. Pt expressed understanding and He was discharged to home in good condition.     Medical Decision Making    History:  Supplemental history from: Documented in chart, if applicable  External Record(s) reviewed: Documented in chart, if applicable., Inpatient Record: 9/23-9/25/23, and Outpatient Record: 9/27/23    Work Up:  Chart documentation includes differential considered and any EKGs or imaging independently interpreted by provider, where specified.  In additional to work up documented, I considered the following work up: Documented in chart, if applicable. and Imaging CT, but deferred due to reassuring exam and labs with low risk.    External consultation:  Discussion of management with another provider: Documented in chart, if applicable    Complicating factors:  Care impacted by chronic illness: N/A  Care affected by social determinants of health: N/A    Disposition considerations: Discharge. I prescribed additional prescription strength medication(s) as charted. I considered admission, but discharged patient after significant clinical improvement.and low risk      CRITICAL CARE: None    ED COURSE  2:05 PM  Met and evaluated patient in triage. Discussed ED plan.   5:00 PM Awaiting repeat trop  5:33 PM discharged to home in good condition by RN.     MEDICATIONS GIVEN IN THE EMERGENCY:  Medications   Lidocaine (LIDOCARE) 4 % Patch 1 patch (1 patch Transdermal $Patch/Med Applied 10/5/23 6177)   hydrOXYzine  (ATARAX) half-tab 12.5 mg (12.5 mg Oral $Given 10/5/23 0168)       NEW PRESCRIPTIONS STARTED AT TODAY'S ER VISIT  Discharge Medication List as of 10/5/2023  6:14 PM        START taking these medications    Details   hydrOXYzine (ATARAX) 25 MG tablet Take 1 tablet (25 mg) by mouth 3 times daily as needed for itching, Disp-42 tablet, R-0, E-Prescribe      Lidocaine (LIDOCARE) 4 % Patch Place 1 patch onto the skin every 24 hours for 5 days To prevent lidocaine toxicity, patient should be patch free for 12 hrs daily.Disp-5 patch, [id number]                =================================================================    HPI    Patient information was obtained from: patient    Use of Intrepreter: N/A       Mark Hooper is a 58 year old male who presents for evaluation of left-sided chest discomfort.  This discomfort is known to the patient and initially occurred approximately 1.5 weeks ago.  He was hospitalized at that time due to an elevated troponin at the outside hospital, subsequent serial troponins, echo and stress test were reassuring here.  He was initiated on losartan and has been complaint to this.  Reports that in the interim he saw his primary care provider who reassured him that his chest discomfort is most likely msk in nature.  He has been using Voltaren gel and ice which has significantly been improving his discomfort.  However, this morning he noticed a resurgence of his left-sided chest wall discomfort that was not relieved with Voltaren gel.  He checked his heart rate on his Apple Watch at that time and noticed that it was elevated into the 100s and at times 130s.  He states that in the past when he had the discomfort his heart rate has been normal into the 70s and he became concerned about this change in the pattern prompting his evaluation in the emergency department.  He states that when he had the chest discomfort he was going about his normal daytime activities.  He was not exerting  himself.  In the past the chest pain has come on spontaneously and has resolved on its own.  He does note that its not a discrete pain, pressure, squeezing or sharp in nature it is more of just a noticeable sensation in the left mid armpit as well as the anterior left chest just below the nipple.  He also reports that prior to the onset of this discomfort he had attempted to start a new workout regimen to include push-ups and upper arm weightlifting.  He does think that this pain could be musculoskeletal in nature, but given the change to his heart rate and continuation of the discomfort he was concerned.    He has not had any symptoms of illness such as fever, sore throat, nausea, vomiting, abdominal pain.  Has not noticed any leg swelling.      REVIEW OF SYSTEMS   As noted in HPI. All other systems negative.    PAST MEDICAL HISTORY:  No past medical history on file.    PAST SURGICAL HISTORY:  No past surgical history on file.        CURRENT MEDICATIONS:    hydrOXYzine (ATARAX) 25 MG tablet  Lidocaine (LIDOCARE) 4 % Patch  ibuprofen (ADVIL/MOTRIN) 200 MG tablet  loratadine (CLARITIN) 10 MG tablet  losartan (COZAAR) 50 MG tablet        ALLERGIES:  Allergies   Allergen Reactions    Pollen Extract Unknown       FAMILY HISTORY:  No family history on file.    SOCIAL HISTORY:   Social History     Socioeconomic History    Marital status:      Spouse name: Not on file    Number of children: Not on file    Years of education: Not on file    Highest education level: Not on file   Occupational History    Not on file   Tobacco Use    Smoking status: Never    Smokeless tobacco: Never   Substance and Sexual Activity    Alcohol use: Not on file    Drug use: Not on file    Sexual activity: Not on file   Other Topics Concern    Not on file   Social History Narrative    Not on file     Social Determinants of Health     Financial Resource Strain: Not on file   Food Insecurity: Not on file   Transportation Needs: Not on file  "  Physical Activity: Not on file   Stress: Not on file   Social Connections: Not on file   Interpersonal Safety: Not on file   Housing Stability: Not on file         VITALS:  Patient Vitals for the past 24 hrs:   BP Temp Temp src Pulse Resp SpO2 Height Weight   10/05/23 1815 (!) 140/95 -- -- 79 19 96 % -- --   10/05/23 1730 -- -- -- 81 17 96 % -- --   10/05/23 1715 (!) 144/100 -- -- 85 19 96 % -- --   10/05/23 1700 (!) 145/89 -- -- 82 15 97 % -- --   10/05/23 1645 (!) 145/83 -- -- 84 16 96 % -- --   10/05/23 1600 (!) 142/96 -- -- 87 13 97 % -- --   10/05/23 1545 (!) 143/91 -- -- 83 12 96 % -- --   10/05/23 1530 (!) 140/87 -- -- 91 13 96 % -- --   10/05/23 1515 132/82 -- -- 88 13 94 % -- --   10/05/23 1500 128/80 -- -- 92 17 95 % -- --   10/05/23 1430 129/74 -- -- 89 11 96 % -- --   10/05/23 1347 -- -- -- 102 -- -- -- --   10/05/23 1344 129/78 98.2  F (36.8  C) Temporal 112 18 97 % 1.727 m (5' 8\") 106.6 kg (235 lb)       PHYSICAL EXAM    Physical Exam  Vitals reviewed.   Constitutional:       General: He is not in acute distress.     Appearance: He is well-developed. He is not ill-appearing, toxic-appearing or diaphoretic.   HENT:      Head: Normocephalic and atraumatic.      Nose: Nose normal.      Mouth/Throat:      Mouth: Mucous membranes are moist.      Pharynx: Oropharynx is clear. No oropharyngeal exudate.   Eyes:      General: No scleral icterus.     Conjunctiva/sclera: Conjunctivae normal.   Cardiovascular:      Rate and Rhythm: Normal rate and regular rhythm.      Pulses: Normal pulses.           Radial pulses are 2+ on the right side and 2+ on the left side.      Heart sounds: No murmur heard.  Pulmonary:      Effort: Pulmonary effort is normal. No tachypnea or respiratory distress.      Breath sounds: Normal breath sounds. No stridor. No decreased breath sounds or wheezing.   Chest:      Chest wall: No tenderness.   Abdominal:      Palpations: Abdomen is soft. There is no mass.      Tenderness: There is no " abdominal tenderness. There is no guarding or rebound.   Musculoskeletal:      Cervical back: Normal range of motion and neck supple.      Right lower leg: No edema.      Left lower leg: No edema.   Skin:     General: Skin is warm and dry.      Capillary Refill: Capillary refill takes less than 2 seconds.      Coloration: Skin is not pale.      Findings: No rash.   Neurological:      General: No focal deficit present.      Mental Status: He is alert and oriented to person, place, and time.      Cranial Nerves: No cranial nerve deficit.   Psychiatric:         Mood and Affect: Mood normal.         Behavior: Behavior normal.            LAB:  All pertinent labs reviewed and interpreted.    Labs Ordered and Resulted from Time of ED Arrival to Time of ED Departure   COMPREHENSIVE METABOLIC PANEL - Abnormal       Result Value    Sodium 139      Potassium 4.0      Carbon Dioxide (CO2) 28      Anion Gap 9      Urea Nitrogen 16.1      Creatinine 1.06      GFR Estimate 82      Calcium 9.4      Chloride 102      Glucose 102 (*)     Alkaline Phosphatase 92      AST 23      ALT 38      Protein Total 7.8      Albumin 4.4      Bilirubin Total 0.5     TROPONIN T, HIGH SENSITIVITY - Normal    Troponin T, High Sensitivity 11     MAGNESIUM - Normal    Magnesium 2.3     TSH WITH FREE T4 REFLEX - Normal    TSH 1.99     N TERMINAL PRO BNP OUTPATIENT - Normal    N Terminal Pro BNP Outpatient <36     D DIMER QUANTITATIVE - Normal    D-Dimer Quantitative <=0.27     INFLUENZA A/B, RSV, & SARS-COV2 PCR - Normal    Influenza A PCR Negative      Influenza B PCR Negative      RSV PCR Negative      SARS CoV2 PCR Negative     TROPONIN T, HIGH SENSITIVITY - Normal    Troponin T, High Sensitivity 11     CBC WITH PLATELETS AND DIFFERENTIAL    WBC Count 7.6      RBC Count 5.68      Hemoglobin 17.1      Hematocrit 50.0      MCV 88      MCH 30.1      MCHC 34.2      RDW 12.3      Platelet Count 262      % Neutrophils 68      % Lymphocytes 22      %  Monocytes 8      Mids % (Monos, Eos, Basos)        % Eosinophils 1      % Basophils 1      % Immature Granulocytes 0      NRBCs per 100 WBC 0      Absolute Neutrophils 5.2      Absolute Lymphocytes 1.7      Absolute Monocytes 0.6      Mids Abs (Monos, Eos, Basos)        Absolute Eosinophils 0.1      Absolute Basophils 0.1      Absolute Immature Granulocytes 0.0      Absolute NRBCs 0.0           RADIOLOGY:  Reviewed all pertinent imaging. Please see official radiology report    No orders to display         EKG:    Performed at: 1344  Impression: sinus tachycardia, left ventricular hypertriphy with repolarization abnormality which was appears stable from prior on 9/27/2023  Vent rate: 102 bpm bpm  Pr interval: 136 ms  QRS duration: 82 ms  QT/Qtc: 318/414 ms  Dr. Lindsey Davis and I have independently reviewed and interpreted the EKG(s) documented above.    Patricia Watters PA-C  Emergency Medicine  [facility] EMERGENCY ROOM  [facility] [facility] [id number] [phone]  Dept: [phone]    This note has in part been created with speech recognition technology and may create an occasional, unintended word/grammar substitution. Errors are generally corrected in real time. Please message me via [employer] if you note any errors requiring clarification.       Patricia Watters PA-C  10/05/23 5592

## 2023-12-28 ENCOUNTER — LAB REQUISITION (OUTPATIENT)
Dept: LAB | Facility: CLINIC | Age: 58
End: 2023-12-28

## 2023-12-28 DIAGNOSIS — I10 ESSENTIAL (PRIMARY) HYPERTENSION: ICD-10-CM

## 2023-12-28 LAB
ALBUMIN SERPL BCG-MCNC: 4 G/DL (ref 3.5–5.2)
ALP SERPL-CCNC: 88 U/L (ref 40–150)
ALT SERPL W P-5'-P-CCNC: 41 U/L (ref 0–70)
ANION GAP SERPL CALCULATED.3IONS-SCNC: 9 MMOL/L (ref 7–15)
AST SERPL W P-5'-P-CCNC: 21 U/L (ref 0–45)
BILIRUB SERPL-MCNC: 0.4 MG/DL
BUN SERPL-MCNC: 10.3 MG/DL (ref 6–20)
CALCIUM SERPL-MCNC: 8.9 MG/DL (ref 8.6–10)
CHLORIDE SERPL-SCNC: 101 MMOL/L (ref 98–107)
CREAT SERPL-MCNC: 0.91 MG/DL (ref 0.67–1.17)
DEPRECATED HCO3 PLAS-SCNC: 28 MMOL/L (ref 22–29)
EGFRCR SERPLBLD CKD-EPI 2021: >90 ML/MIN/1.73M2
GLUCOSE SERPL-MCNC: 107 MG/DL (ref 70–99)
POTASSIUM SERPL-SCNC: 4.3 MMOL/L (ref 3.4–5.3)
PROT SERPL-MCNC: 7.2 G/DL (ref 6.4–8.3)
SODIUM SERPL-SCNC: 138 MMOL/L (ref 135–145)

## 2023-12-28 PROCEDURE — 80053 COMPREHEN METABOLIC PANEL: CPT | Performed by: STUDENT IN AN ORGANIZED HEALTH CARE EDUCATION/TRAINING PROGRAM
